# Patient Record
Sex: FEMALE | Race: WHITE | Employment: FULL TIME | URBAN - METROPOLITAN AREA
[De-identification: names, ages, dates, MRNs, and addresses within clinical notes are randomized per-mention and may not be internally consistent; named-entity substitution may affect disease eponyms.]

---

## 2018-12-28 ENCOUNTER — OFFICE VISIT (OUTPATIENT)
Dept: FAMILY MEDICINE CLINIC | Age: 48
End: 2018-12-28

## 2018-12-28 VITALS
SYSTOLIC BLOOD PRESSURE: 116 MMHG | RESPIRATION RATE: 16 BRPM | DIASTOLIC BLOOD PRESSURE: 78 MMHG | BODY MASS INDEX: 26.22 KG/M2 | TEMPERATURE: 97.7 F | WEIGHT: 148 LBS | HEIGHT: 63 IN | HEART RATE: 64 BPM

## 2018-12-28 DIAGNOSIS — E16.1 REACTIVE HYPOGLYCEMIA: ICD-10-CM

## 2018-12-28 DIAGNOSIS — Z98.890 HISTORY OF BILATERAL BREAST REDUCTION SURGERY: ICD-10-CM

## 2018-12-28 DIAGNOSIS — Z00.00 WELL ADULT HEALTH CHECK: Primary | ICD-10-CM

## 2018-12-28 DIAGNOSIS — Z12.39 SCREENING FOR BREAST CANCER: ICD-10-CM

## 2018-12-28 DIAGNOSIS — S43.421A SPRAIN OF RIGHT ROTATOR CUFF CAPSULE, INITIAL ENCOUNTER: ICD-10-CM

## 2018-12-28 DIAGNOSIS — Z13.1 SCREENING FOR DIABETES MELLITUS: ICD-10-CM

## 2018-12-28 DIAGNOSIS — Z13.220 SCREENING, LIPID: ICD-10-CM

## 2018-12-28 DIAGNOSIS — Z11.4 ENCOUNTER FOR SCREENING FOR HIV: ICD-10-CM

## 2018-12-28 PROCEDURE — 99386 PREV VISIT NEW AGE 40-64: CPT | Performed by: FAMILY MEDICINE

## 2018-12-28 RX ORDER — INFLUENZA A VIRUS A/SINGAPORE/GP1908/2015 IVR-180 (H1N1) ANTIGEN (MDCK CELL DERIVED, PROPIOLACTONE INACTIVATED), INFLUENZA A VIRUS A/NORTH CAROLINA/04/2016 (H3N2) HEMAGGLUTININ ANTIGEN (MDCK CELL DERIVED, PROPIOLACTONE INACTIVATED), INFLUENZA B VIRUS B/IOWA/06/2017 HEMAGGLUTININ ANTIGEN (MDCK CELL DERIVED, PROPIOLACTONE INACTIVATED), INFLUENZA B VIRUS B/SINGAPORE/INFTT-16-0610/2016 HEMAGGLUTININ ANTIGEN (MDCK CELL DERIVED, PROPIOLACTONE INACTIVATED) 15; 15; 15; 15 UG/.5ML; UG/.5ML; UG/.5ML; UG/.5ML
INJECTION, SUSPENSION INTRAMUSCULAR
Refills: 0 | COMMUNITY
Start: 2018-10-10 | End: 2018-12-28

## 2018-12-28 RX ORDER — ALBUTEROL SULFATE 90 UG/1
AEROSOL, METERED RESPIRATORY (INHALATION)
COMMUNITY
End: 2018-12-28

## 2018-12-28 RX ORDER — CLINDAMYCIN HYDROCHLORIDE 150 MG/1
CAPSULE ORAL
COMMUNITY
End: 2018-12-28

## 2018-12-28 RX ORDER — OXYCODONE HYDROCHLORIDE AND ACETAMINOPHEN 5; 325 MG/1; MG/1
TABLET ORAL
COMMUNITY
End: 2018-12-28

## 2018-12-28 RX ORDER — OSELTAMIVIR PHOSPHATE 75 MG/1
CAPSULE ORAL
COMMUNITY
End: 2018-12-28

## 2018-12-28 RX ORDER — NAPROXEN 500 MG/1
TABLET ORAL
COMMUNITY
End: 2018-12-28

## 2018-12-28 RX ORDER — METHOCARBAMOL 750 MG/1
TABLET, FILM COATED ORAL
COMMUNITY
End: 2018-12-28

## 2018-12-28 RX ORDER — DULOXETIN HYDROCHLORIDE 60 MG/1
CAPSULE, DELAYED RELEASE ORAL
COMMUNITY
End: 2018-12-28

## 2018-12-28 RX ORDER — PHENDIMETRAZINE TARTRATE 105 MG/1
CAPSULE, EXTENDED RELEASE ORAL
COMMUNITY
End: 2018-12-28

## 2018-12-28 RX ORDER — HYDROCHLOROTHIAZIDE 25 MG/1
TABLET ORAL
COMMUNITY
End: 2018-12-28

## 2018-12-28 RX ORDER — PROMETHAZINE HYDROCHLORIDE 25 MG/1
TABLET ORAL
COMMUNITY
End: 2018-12-28

## 2018-12-28 RX ORDER — PHENTERMINE HYDROCHLORIDE 30 MG/1
30 CAPSULE ORAL EVERY MORNING
COMMUNITY
End: 2021-08-30

## 2018-12-28 RX ORDER — METHYLPREDNISOLONE 4 MG/1
TABLET ORAL
COMMUNITY
End: 2018-12-28

## 2018-12-28 ASSESSMENT — PATIENT HEALTH QUESTIONNAIRE - PHQ9
SUM OF ALL RESPONSES TO PHQ QUESTIONS 1-9: 0
SUM OF ALL RESPONSES TO PHQ9 QUESTIONS 1 & 2: 0
2. FEELING DOWN, DEPRESSED OR HOPELESS: 0
SUM OF ALL RESPONSES TO PHQ QUESTIONS 1-9: 0
1. LITTLE INTEREST OR PLEASURE IN DOING THINGS: 0

## 2018-12-28 NOTE — PATIENT INSTRUCTIONS
INSTRUCTIONS  NEXT APPOINTMENT: Please schedule fasting annual physical (30 minutes) in one year. OK to have water, black coffee and medications (except for diabetes medicines). · PLEASE TAKE THIS FORM TO CHECK-OUT WINDOW TO SCHEDULE NEXT VISIT. PLEASE GET FASTING BLOODWORK DRAWN SOON. Lab is on first floor in [address]. Hours Monday to Friday 7 AM to 5 PM.  Take orders with you. · RESULTS- most blood tests back in couple days. We will call you if any problems. If bloodwork good, you will get letter in mail or notified thru [address] (if signed up) within 2 weeks. If you do not, please call office. · For pain, may take OTC tylenol arthritis (acetaminophen 8 hour) 2 tabs three times per day  · Use heat 20 minutes on painful joint/muscle. Then do stretches. May ice any sore spots or for swelling afterwards. Patient Education     ROTATOR CUFF INJURY     What is a rotator cuff injury? A rotator cuff injury is a strain or tear in the group of tendons and muscles that hold your shoulder joint together and help move your shoulder. How does it occur? A rotator cuff injury may result from:   using your arm to break a fall   falling onto your arm   lifting a heavy object   use of your shoulder in sports with a repetitive overhead movement, such as swimming, baseball (mainly pitchers), football, and tennis, which gradually strains the tendon   manual labor such as painting, plastering, raking leaves, or housework. What are the symptoms? The symptoms of a torn rotator cuff are:   arm and shoulder pain   shoulder weakness   shoulder tenderness   loss of shoulder movement, especially overhead. How is it diagnosed? Your doctor will perform a physical exam and check your shoulder for pain, tenderness, and loss of motion as you move your arm in all directions. Your doctor also will ask whether your shoulder pain began suddenly or gradually.  An x-ray may be done to rule out fractures and bone hands, palms up. Your upper arms should be resting on the floor, your elbows at your sides and bent 90°. Using your good arm, push your injured arm out away from your body while keeping the elbow of the injured arm at your side. Hold the stretch for 5 seconds. Repeat 10 times. Isometrics   1. External rotation: Standing in a doorway with your elbow bent 90° and the back of your hand pressing against the door frame, attempt to press your hand outward into the door frame. Hold for 5 seconds. Do 3 sets of 10.   2. Internal rotation: Standing in a doorway with your elbow bent 90° and the front of your hand pressing against the door frame, attempt to press your palm into the door frame. Hold for 5 seconds. Do 3 sets of 10. Tubing exercise for external rotation: Stand resting the hand of your injured side against your stomach. With that hand grasp tubing that is connected to a doorknob or other object at waist level. Keeping your elbow in at your side, rotate your arm outward and away from your waist. Make sure you keep your elbow bent 90 degrees and your forearm parallel to the floor. Repeat 10 times. Build up to 3 sets of 10. Supraspinatus exercise: Standing with your arms at your sides and your thumbs pointed toward the floor. Lift your arms up and out from your sides, keeping your elbows straight. Lift your hands only to shoulder level. Hold 5 seconds. Do 3 sets of 10. Gradually add weight to your hands to increase your strength. Neck Spasm Rehabilitation Exercises     You may do these exercises right away. Neck flexion   Forward: Clasp your hands behind your head and let the weight of your arms pull your chin to your chest. Relax. Hold for a count of 15. Do this 3 times. Right: Turn your head to the right and clasp your hands behind your head. Let the weight of your arms pull your chin to the right side of your chest. Relax. Hold for a count of 15. Do this 3 times.    Left: Turn your head to the left and clasp your hands behind your head. Let the weight of your arms pull your chin to the left side of your chest. Relax. Hold for a count of 15. Do this 3 times. Upper trapezius stretch: The upper trapezius muscle connects your shoulder to your head. Sitting in an upright position, put your right arm behind your back and gently grasp the right side of your head with your left hand to help tilt your head toward the left. You will feel a gentle stretch on your right side. Hold for 15 to 30 seconds. Repeat 3 times on each side. Neck rotation   Right side: Rotate your neck by looking over your right shoulder. Lift your right hand and place your palm on the left side of your chin. Push your chin with your palm toward your right shoulder. Hold for a count of 10. Do this 3 times. Left side: Rotate your neck by looking over your left shoulder. Lift your left hand and place your palm on the right side of your chin. Push your chin with your palm toward your left shoulder. Hold for a count of 10. Do this 3 times. Scapular squeezes: While sitting or standing with your arms by your sides, squeeze your shoulder blades together and hold for 5 seconds. Do 3 sets of 10. Thoracic extension: While sitting in a chair, clasp both arms behind your head. Gently arch backward and look up toward the ceiling. Repeat 10 times. Do this several times per day. OSTEOARTHRITIS    Overview   What is arthritis? Arthritis is inflammation (swelling) of the joints. It causes pain and usually also limits movement of the joints that are affected. There are many kinds of arthritis. What is osteoarthritis? Osteoarthritis (say: bdz-pwd-yd-arth-rye-tis) is the most common kind of arthritis. Osteoarthritis (also called degenerative joint disease) can affect any joint in your body and causes the cushion layer between your bones (called the cartilage) to wear away.      Causes & Risk Factors   What causes really help? Yes. Special devices (see box below) and different ways of doing things can help people who have arthritis stay independent for as long as possible. These devices help protect your joints and keep you moving. For example, if you learn to use a cane the right way, you can help reduce the amount of pressure your weight puts on your hip joint when you walk by up to 60%. Talk to your doctor if you think a special device may help your arthritis. Special devices for people who have arthritis  Canes, walkers and splints   Shoe inserts, wedges or pads   Special fasteners (such as Velcro) on clothing   Large  for tools and utensils (wrap foam or fabric around items with narrow handles, like pens)   Wall-mounted jar openers   Electric appliances, such as can openers and knives   Mobile shower heads   Bath seats and grab bars for the bathtub     Will special exercises really help? Yes. Exercise keeps your muscles strong and helps you stay flexible. Exercises that don't strain your joints are best. To avoid pain and injury, choose exercises that can be done in small amounts with rest time in between. Dancing, weight lifting and bike riding are good exercises for people who have arthritis. Try tightening your muscles and then relaxing them a number of times. You can do this with all of your major muscle groups. You could also try an \"aquacise\" program available through your local swimming pool or community center. These programs involve special movements in the pool, with much of your body's weight held up by water. Talk to your doctor before starting a new exercise program.    Should I use heat to ease pain? Using heat may reduce your pain and stiffness. Heat can be applied through warm baths, hot towels, hot water bottles or heating pads. Try alternating heat with ice packs.

## 2019-01-27 PROBLEM — Z00.00 WELL ADULT HEALTH CHECK: Status: RESOLVED | Noted: 2018-12-28 | Resolved: 2019-01-27

## 2019-01-27 PROBLEM — Z12.39 SCREENING FOR BREAST CANCER: Status: RESOLVED | Noted: 2018-12-28 | Resolved: 2019-01-27

## 2019-04-29 ENCOUNTER — TELEPHONE (OUTPATIENT)
Dept: FAMILY MEDICINE CLINIC | Age: 49
End: 2019-04-29

## 2019-04-29 RX ORDER — AZITHROMYCIN 250 MG/1
TABLET, FILM COATED ORAL
Qty: 1 PACKET | Refills: 0 | Status: SHIPPED | OUTPATIENT
Start: 2019-04-29 | End: 2019-05-04

## 2019-04-29 NOTE — TELEPHONE ENCOUNTER
Pt is calling to say that she has a sinus infection and a bad cough. The pt will like this to be called in due to she is out of town for work. The pt has been sick for 4 days now, with greenish phloem coming up, and slight head ache with sinus pressure. PT CB# [phone]    Can we send the Rx to [facility] located at [facility]. [phone]  FAX 6 [facility].

## 2019-09-12 ENCOUNTER — TELEPHONE (OUTPATIENT)
Dept: FAMILY MEDICINE CLINIC | Age: 49
End: 2019-09-12

## 2020-01-26 ENCOUNTER — HOSPITAL ENCOUNTER (EMERGENCY)
Age: 50
Discharge: HOME | End: 2020-01-26
Payer: COMMERCIAL

## 2020-01-26 ENCOUNTER — APPOINTMENT (OUTPATIENT)
Dept: GENERAL RADIOLOGY | Age: 50
End: 2020-01-26
Payer: COMMERCIAL

## 2020-01-26 VITALS
WEIGHT: 154.32 LBS | HEART RATE: 73 BPM | SYSTOLIC BLOOD PRESSURE: 114 MMHG | TEMPERATURE: 97.6 F | OXYGEN SATURATION: 98 % | RESPIRATION RATE: 15 BRPM | BODY MASS INDEX: 27.34 KG/M2 | DIASTOLIC BLOOD PRESSURE: 73 MMHG

## 2020-01-26 PROCEDURE — 73560 X-RAY EXAM OF KNEE 1 OR 2: CPT

## 2020-01-26 PROCEDURE — 99283 EMERGENCY DEPT VISIT LOW MDM: CPT

## 2020-01-26 PROCEDURE — 73562 X-RAY EXAM OF KNEE 3: CPT

## 2020-01-26 RX ORDER — IBUPROFEN 600 MG/1
600 TABLET ORAL
Qty: 40 TABLET | Refills: 0 | Status: SHIPPED | OUTPATIENT
Start: 2020-01-26 | End: 2021-08-30

## 2020-01-26 RX ORDER — ACETAMINOPHEN 500 MG
1000 TABLET ORAL
Qty: 30 TABLET | Refills: 0 | Status: SHIPPED | OUTPATIENT
Start: 2020-01-26 | End: 2021-08-30 | Stop reason: ALTCHOICE

## 2020-01-26 ASSESSMENT — PAIN DESCRIPTION - LOCATION: LOCATION: KNEE

## 2020-01-26 ASSESSMENT — PAIN DESCRIPTION - ONSET: ONSET: ON-GOING

## 2020-01-26 ASSESSMENT — PAIN SCALES - GENERAL
PAINLEVEL_OUTOF10: 9
PAINLEVEL_OUTOF10: 7

## 2020-01-26 ASSESSMENT — PAIN DESCRIPTION - FREQUENCY: FREQUENCY: CONTINUOUS

## 2020-01-26 ASSESSMENT — PAIN DESCRIPTION - PROGRESSION: CLINICAL_PROGRESSION: NOT CHANGED

## 2020-01-26 ASSESSMENT — PAIN DESCRIPTION - ORIENTATION: ORIENTATION: RIGHT

## 2020-01-26 ASSESSMENT — PAIN - FUNCTIONAL ASSESSMENT: PAIN_FUNCTIONAL_ASSESSMENT: PREVENTS OR INTERFERES SOME ACTIVE ACTIVITIES AND ADLS

## 2020-01-26 ASSESSMENT — PAIN DESCRIPTION - PAIN TYPE: TYPE: ACUTE PAIN

## 2020-01-26 NOTE — ED PROVIDER NOTES
**EVALUATED BY ADVANCED PRACTICE PROVIDER**        [address]      Pt Name: Artemio Amanda  YIS:[id number]  [place] 1970  Date of evaluation: 2020  Provider: Oriana Estevez PA-C      Chief Complaint:    Chief Complaint   Patient presents with    Knee Pain     Pt reports to the ED with R knee pain with \"a tight and burning feeling\". Pt reports a consitant pain. Hx of lower R foot pain and progressively going up the R leg. Nursing Notes, Past Medical Hx, Past Surgical Hx, Social Hx, Allergies, and Family Hx were all reviewed and agreed with or any disagreements were addressed in the HPI.    HPI:  (Location, Duration, Timing, Severity, Quality, Assoc Sx, Context, Modifying factors)  This is a  47 y.o. female presenting with significant other. Patient with complaint right knee pain x2-3 weeks. Patient states the bathtub about 2 weeks ago twisted the knee but did not impact the knee on fall. Patient states progressive over the past 1 week. Worse going up stairs. She indicates knee feels tight at times. She also notes clicking or even a locking sensation where she cannot fully flex the knee. No prior history of knee trouble. She also indicates some lateral mid and distal thigh pain. She has some posterior lateral knee pain. No hip pain or ankle pain. PastMedical/Surgical History:      Diagnosis Date    Hypoglycemia     Reactive hypoglycemia 2018         Procedure Laterality Date    BREAST REDUCTION SURGERY  10/2004     SECTION  , 1991    x 2    CHOLECYSTECTOMY  2015    HYSTERECTOMY  2006    partial    ULNAR TUNNEL RELEASE         Medications:  Previous Medications    PHENTERMINE 30 MG CAPSULE    Take 30 mg by mouth every morning. .         Review of Systems:  Review of Systems  Positives and Pertinent negatives as per HPI.   Except as noted above in the ROS, problem specific ROS was PA-C have directly visualized the radiologic plain film image(s) with the below findings:    X-ray shows no evidence of acute osseous abnormality. Interpretation per the Radiologist below, if available at the time of this note:    XR KNEE RIGHT (1-2 VIEWS)   Preliminary Result   No acute abnormality of the right knee. No significant arthritic change is   evident. Xr Knee Right (1-2 Views)    Result Date: 1/26/2020  No acute abnormality of the right knee. No significant arthritic change is evident. MEDICAL DECISION MAKING / ED COURSE:      PROCEDURES:   Procedures    Ace wrap applied by staff. Inspection by myself reveals appropriate placement without neurovascular compromise. Patient was given:  Medications - No data to display    I do believe the patient has chondromalacia of the femoral condyle of the right knee. This is an area most tenderness. Ace wrap applied. Start NSAID. Refer to orthopedics. Ice/heat may benefit. The patient does express understanding of her diagnosis and the treatment plan. The patient tolerated their visit well. I evaluated the patient. The physician was available for consultation as needed. The patient and / or the family were informed of the results of any tests, a time was given to answer questions, a plan was proposed and they agreed with plan. CLINICAL IMPRESSION:  1. Acute pain of right knee    2.  Chondromalacia of knee, right        DISPOSITION Decision To Discharge 01/26/2020 12:10:25 PM      PATIENT REFERRED TO:  Brissa Donald MD  [address]  [address] [place] [address]  [phone]    Schedule an appointment as soon as possible for a visit in 3 days      Char Mcduffie MD  [address] [facility] [address] [facility] [id number] [phone]    Schedule an appointment as soon as possible for a visit   As needed    [facility] Emergency Department  [address]

## 2020-02-05 ENCOUNTER — TELEPHONE (OUTPATIENT)
Dept: FAMILY MEDICINE CLINIC | Age: 50
End: 2020-02-05

## 2020-12-28 ENCOUNTER — TELEPHONE (OUTPATIENT)
Dept: FAMILY MEDICINE CLINIC | Age: 50
End: 2020-12-28

## 2021-08-30 ENCOUNTER — OFFICE VISIT (OUTPATIENT)
Dept: FAMILY MEDICINE CLINIC | Age: 51
End: 2021-08-30
Payer: COMMERCIAL

## 2021-08-30 VITALS
DIASTOLIC BLOOD PRESSURE: 70 MMHG | SYSTOLIC BLOOD PRESSURE: 118 MMHG | BODY MASS INDEX: 27.89 KG/M2 | HEART RATE: 77 BPM | WEIGHT: 157.38 LBS | HEIGHT: 63 IN | OXYGEN SATURATION: 98 %

## 2021-08-30 DIAGNOSIS — S16.1XXA STRAIN OF NECK MUSCLE, INITIAL ENCOUNTER: Primary | ICD-10-CM

## 2021-08-30 DIAGNOSIS — S46.911A STRAIN OF RIGHT SHOULDER, INITIAL ENCOUNTER: ICD-10-CM

## 2021-08-30 DIAGNOSIS — S29.019A THORACIC MYOFASCIAL STRAIN, INITIAL ENCOUNTER: ICD-10-CM

## 2021-08-30 PROCEDURE — 99214 OFFICE O/P EST MOD 30 MIN: CPT | Performed by: NURSE PRACTITIONER

## 2021-08-30 RX ORDER — ACETAMINOPHEN 160 MG
TABLET,DISINTEGRATING ORAL DAILY
COMMUNITY

## 2021-08-30 RX ORDER — LEVOTHYROXINE AND LIOTHYRONINE 19; 4.5 UG/1; UG/1
30 TABLET ORAL DAILY
COMMUNITY

## 2021-08-30 RX ORDER — PROGESTERONE 200 MG/1
200 CAPSULE ORAL DAILY
COMMUNITY

## 2021-08-30 RX ORDER — PREDNISONE 20 MG/1
20 TABLET ORAL 2 TIMES DAILY
Qty: 10 TABLET | Refills: 0 | Status: SHIPPED | OUTPATIENT
Start: 2021-08-30 | End: 2021-09-04

## 2021-08-30 RX ORDER — CYCLOBENZAPRINE HCL 10 MG
10 TABLET ORAL 3 TIMES DAILY PRN
Qty: 21 TABLET | Refills: 0 | Status: SHIPPED | OUTPATIENT
Start: 2021-08-30 | End: 2021-09-09

## 2021-08-30 ASSESSMENT — PATIENT HEALTH QUESTIONNAIRE - PHQ9
SUM OF ALL RESPONSES TO PHQ QUESTIONS 1-9: 0
1. LITTLE INTEREST OR PLEASURE IN DOING THINGS: 0
SUM OF ALL RESPONSES TO PHQ QUESTIONS 1-9: 0
SUM OF ALL RESPONSES TO PHQ QUESTIONS 1-9: 0
SUM OF ALL RESPONSES TO PHQ9 QUESTIONS 1 & 2: 0
2. FEELING DOWN, DEPRESSED OR HOPELESS: 0
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
## 2021-09-09 ENCOUNTER — HOSPITAL ENCOUNTER (OUTPATIENT)
Dept: PHYSICAL THERAPY | Age: 51
Setting detail: THERAPIES SERIES
Discharge: HOME | End: 2021-09-09
Payer: COMMERCIAL

## 2021-09-09 PROCEDURE — 97110 THERAPEUTIC EXERCISES: CPT

## 2021-09-09 PROCEDURE — 97140 MANUAL THERAPY 1/> REGIONS: CPT

## 2021-09-09 PROCEDURE — 97161 PT EVAL LOW COMPLEX 20 MIN: CPT

## 2021-09-09 NOTE — FLOWSHEET NOTE
[address] [facility] [address]  Phone: [phone]   Fax:     [phone]    Physical Therapy Treatment Note/ Progress Report:     Date:  2021    Patient Name:  Myla Kuhn    :  1970  MRN: [id number]    Pertinent Medical History: Additional Pertinent Hx: hypoglycemia    Medical/Treatment Diagnosis Information:  · Diagnosis: S16. 1XXA (ICD-10-CM) - Strain of neck muscle, initial [id number] (ICD-10-CM) - Thoracic myofascial strain, initial [id number] (ICD-10-CM) - Strain of right shoulder, initial encounter  · Treatment Diagnosis: decreased function    Insurance/Certification information:  PT Insurance Information: generic auto  Physician Information:  Referring Practitioner: Keyonna Arias  Plan of care signed (Y/N): routed  Date of Patient follow up with Physician:      Progress Report: []  Yes  [x]  No     Date Range for reporting period:  Beginnin2021  Ending:     Progress report due (10 Rx/or 30 days whichever is less):      Recertification due (POC duration/ or 90 days whichever is less):      Visit # Insurance/POC Allowable Auth Needed   1 12 []Yes    []No     Functional Outcomes Measure:    Date Assessed: at eval  ndi- 18    Pain level:  5/10     SUBJECTIVE:  Pt states, \" I am still really sore with certain things \"    OBJECTIVE:   :   CERV ROM       Cervical Flexion 25 Pain with all motions on the right   Cervical Extension 10       Left Right   Cervical SB 10 10   Cervical rotation 30 35   Quadrant   +   Dorsal Glide        UE ROM Left Right   Shoulder Flex wfl 90 pain in thoracic, cerv, traps   Shoulder Abd   70   Shoulder ER   50   Shoulder IR   50   Elbow flex/ext   wfl   Wrist flex/ext/pro/sup       Finger flex/ext/opposition       Shoulder AROM WNL w OP       UE Strength  Left Right   Shoulder Flex   3+   Shoulder Scap   3+   Shoulder ABd (C5 Axillary)   3+   Shoulder ER    3+   Shoulder IR   3+           RESTRICTIONS/PRECAUTIONS:     Exercises/Interventions:   Therapeutic Ex (03369)  Min: Resistance/Repetitions Notes   cerv isos     Traps stretch     rotation     Add/ext                         Manual Intervention (70117)  Min:     Cerv mobs/manip     Thoracic mobs/manip     CT manip     Rib mobilizations     STM          NMR re-education (28225)  Min:               Therapeutic Activity (49770)  Min:               Modalities  Min:                  Other Therapeutic Activities:  Pt was educated on PT POC, Diagnosis, Prognosis, pathomechanics as well as frequency and duration of scheduling future physical therapy appointments. Time was also taken on this day to answer all patient questions and participation in PT. Reviewed appointment policy in detail with patient and patient verbalized understanding. Home Exercise Program:Patient demonstrated proper technique, good tolerance,  and was given written instructions for the above exercises  Access Code: [email or web address]  URL: [email or web address]  Date: 09/09/2021  Prepared by: Jean Pierre Horner    Exercises  Supine Cervical Rotation AROM on Pillow - 1 x daily - 7 x weekly - 3 sets - 10 reps  Hooklying Isometric Upper Neck Rotation - 1 x daily - 7 x weekly - 3 sets - 10 reps  Seated Shoulder Shrug Circles AROM Forward - 1 x daily - 7 x weekly - 3 sets - 10 reps  Modified Sidelying Thoracic Rotation - 1 x daily - 7 x weekly - 3 sets - 10 reps  Seated Scapular Retraction - 1 x daily - 7 x weekly - 3 sets - 10 reps      Therapeutic Exercise and NMR EXR  [] (13125) Provided verbal/tactile cueing for activities related to strengthening, flexibility, endurance, ROM  for improvements in cervical, postural, scapular, scapulothoracic and UE control with self care, reaching, carrying, lifting, house/yardwork, driving/computer work.    [] (61701) Provided verbal/tactile cueing for activities related to improving balance, coordination, kinesthetic sense, posture, motor skill, proprioception  to assist with cervical, scapular, scapulothoracic and UE control with self care, reaching, carrying, lifting, house/yardwork, driving/computer work. Therapeutic Activities:    [] (15505 or 39419) Provided verbal/tactile cueing for activities related to improving balance, coordination, kinesthetic sense, posture, motor skill, proprioception and motor activation to allow for proper function of cervical, scapular, scapulothoracic and UE control with self care, carrying, lifting, driving/computer work.      Home Exercise Program:    [] (81631) Reviewed/Progressed HEP activities related to strengthening, flexibility, endurance, ROM of cervical, scapular, scapulothoracic and UE control with self care, reaching, carrying, lifting, house/yardwork, driving/computer work  [] (14131) Reviewed/Progressed HEP activities related to improving balance, coordination, kinesthetic sense, posture, motor skill, proprioception of cervical, scapular, scapulothoracic and UE control with self care, reaching, carrying, lifting, house/yardwork, driving/computer work      Manual Treatments:  PROM / STM / Oscillations-Mobs:  G-I, II, III, IV (PA's, Inf., Post.)  [] (58343) Provided manual therapy to mobilize soft tissue/joints of cervical/CT, scapular GHJ and UE for the purpose of decreasing headache, modulating pain, promoting relaxation,  increasing ROM, reducing/eliminating soft tissue swelling/inflammation/restriction, improving soft tissue extensibility and allowing for proper ROM for normal function with self care, reaching, carrying, lifting, house/yardwork, driving/computer work    If [facility] Please Indicate Time In/Out  CPT Code Time in Time out                                   Approval Dates:  CPT Code Units Approved Units Used  Date Updated:                     Charges:  Timed Code Treatment Minutes: 30   Total Treatment Minutes: 50     [x] EVAL (LOW) 65694 (typically 20 minutes face-to-face)  [] EVAL (MOD) 76178 (typically 30 minutes face-to-face)  [] EVAL (HIGH) 14438 (typically 45 minutes face-to-face)  [] RE-EVAL     [x] OJ(04129) x 1    [] Dry needle 1 or 2 Muscles (75865)  [] NMR (82093) x     [] Dry needle 3+ Muscles (58075)  [x] Manual (75843) x 1    [] Ultrasound (27528) x  [] TA (79264) x     [] Mech Traction (68025)  [] ES(attended) (26832)     [] ES (un) (68981):   [] Vasopump (21338) [] Ionto (39746)   [] Other:    GOALSGOALS:  Patient stated goal: \" I want to have less pain \"  []? Progressing: []? Met: []? Not Met: []? Adjusted     Therapist goals for Patient:   Short Term Goals: To be achieved in: 2 weeks  1. Independent in HEP and progression per patient tolerance, in order to prevent re-injury. []? Progressing: []? Met: []? Not Met: []? Adjusted  2. Patient will have a decrease in pain to facilitate improvement in movement, function, and ADLs as indicated by Functional Deficits. []? Progressing: []? Met: []? Not Met: []? Adjusted     Long Term Goals: To be achieved in: 8 weeks  1. Disability index score of 25%% or less for the NDI to assist with reaching prior level of function. []? Progressing: []? Met: []? Not Met: []? Adjusted  2. Patient will demonstrate increased AROM to [facility] of cervical/thoracic spine to allow for proper joint functioning as indicated by patients Functional Deficits. []? Progressing: []? Met: []? Not Met: []? Adjusted  3. Patient will demonstrate an increase in postural awareness and control and activation of  Deep cervical stabilizers to allow for proper functional mobility as indicated by patients Functional Deficits. []? Progressing: []? Met: []? Not Met: []? Adjusted  4. Patient will return to 75 functional activities without increased symptoms or restriction. []? Progressing: []? Met: []? Not Met: []? Adjusted  5. (patient specific functional goal)    []? Progressing: []? Met: []? Not Met: []?  Adjusted ASSESSMENT:  See eval    Treatment/Activity Tolerance:  [x] Patient tolerated treatment well [] Patient limited by fatique  [] Patient limited by pain  [] Patient limited by other medical complications  [] Other:     Overall Progression Towards Functional goals/ Treatment Progress Update:  [] Patient is progressing as expected towards functional goals listed. [] Progression is slowed due to complexities/Impairments listed. [] Progression has been slowed due to co-morbidities. [x] Plan just implemented, too soon to assess goals progression <30days   [] Goals require adjustment due to lack of progress  [] Patient is not progressing as expected and requires additional follow up with physician  [] Other    Prognosis for POC: [x] Good [] Fair  [] Poor    Patient requires continued skilled intervention: [x] Yes  [] No        PLAN: Cervical, Thoracic, scapular, shoulder rom, strength, mfr, joint mobs, postural exercises,  stretches, modalities, patient education , home exercise plan, cervical traction, work, resting, and sleeping positions, may do dn      [] Continue per plan of care [] Alter current plan (see comments)  [x] Plan of care initiated [] Hold pending MD visit [] Discharge    Electronically signed by: Damaris Herrera PT    Note: If patient does not return for scheduled/recommended follow up visits, this note will serve as a discharge from care along with the most recent update on progress.

## 2021-09-09 NOTE — PLAN OF CARE
[facility]  [address] [facility]. [place] [address]  Phone: [phone]   Fax:     [phone]          Physical Therapy Certification    Dear Referring Practitioner: Power Balderas think this patient may benefit from dry needling. Please sign the following if you are in agreement with this. If you have any reservations or questions please contact me at [phone]  Thanks, Sincerely, [facility]ros PT      We had the pleasure of evaluating the following patient for physical therapy services at [facility]. A summary of our findings can be found in the initial assessment below. This includes our plan of care. If you have any questions or concerns regarding these findings, please do not hesitate to contact me at the office phone number checked above. Thank you for the referral.       Physician Signature:_______________________________Date:__________________  By signing above (or electronic signature), therapists plan is approved by physician            Patient: Katlin Sanford   : 1970   MRN: [id number]  Referring Physician: Referring Practitioner: Elsa Sullivan      Evaluation Date: 2021      Medical Diagnosis Information:  Diagnosis: S16. 1XXA (ICD-10-CM) - Strain of neck muscle, initial [id number] (ICD-10-CM) - Thoracic myofascial strain, initial [id number] (ICD-10-CM) - Strain of right shoulder, initial encounter   Treatment Diagnosis: decreased function                                         Insurance information: PT Insurance Information: generic auto    Precautions/ Contra-indications:   Latex Allergy:  [x]NO      []YES  Preferred Language for Healthcare:   [x]English       []other:    C-SSRS Triggered by Intake questionnaire (Past 2 wk assessment ):   [x] No, Questionnaire did not trigger screening.   [] Yes, Patient intake triggered C-SSRS Shoulder ABd (C5 Axillary)  3+   Shoulder ER   3+   Shoulder IR  3+   Elbow Flex (C5 Musc)  5   Elbow Ext (C7 Radial)     Wrist Flex (C6 Radial)     Wrist Ext (C7 Radial)     Finger flex (C8 median)     Finger ext (C7 Radial-PIN)     APB (T1 Median)     Finger Abd (T1 Ulnar)     UE myotomes WNL        Reflexes Normal Abnormal Comments               S1-2 Seated achilles [] []    S1-2 Prone knee bend [] []    L3-4 Patellar tendon [] []    C5-6 Biceps [x] []    C6 Brachioradialis [x] []    C7-8 Triceps [] [x] 1     Reflexes/Sensation:    [x]Dermatomes/Myotomes intact    [x]Reflexes equal and normal bilaterally   []Other:    Joint mobility:    []Normal    [x]Hypo   []Hyper        Orthopedic Special Tests:Laird, Belly press, Hornblower, Cross body all painful, hard to distinguish at this time. Cluster Testing  Normal Abnormal N/A Comments   Babinski Test [] [] []    Kerr Test [] [] []    Inverted Sup Sign [] [] []    Alar Ligament Test [] [] []    Transverse Ligament Test [] [] []    Sharp-Brayan Test [] [] []    Hautards Test [] [] []    Vertebral Artery Test [] [] []             Neural dynamic/ Tension testing Normal Abnormal N/A Comments   Spurling Maneuver:  [] [x] []    Distraction testing: [] [] []    ULNT [] [] []    Shoulder Abd testing  [] [] []    Cerv Rot/Lat Flex- 1st Rib [] [] []    Deep Neck Flex/endurance testing [] [] []    Craniocerv Flex testing Valerie Red [] [] []    End Range Tolerance testing. [] [] []     [] [] []                           [x] Patient history, allergies, meds reviewed. Medical chart reviewed. See intake form. Review Of Systems (ROS):  [x]Performed Review of systems (Integumentary, CardioPulmonary, Neurological) by intake and observation. Intake form has been scanned into medical record. Patient has been instructed to contact their primary care physician regarding ROS issues if not already being addressed at this time.       Co-morbidities/Complexities (which will affect course of rehabilitation):   []None           Arthritic conditions   []Rheumatoid arthritis (M05.9)  []Osteoarthritis (M19.91)   Cardiovascular conditions   []Hypertension (I10)  []Hyperlipidemia (E78.5)  []Angina pectoris (I20)  []Atherosclerosis (I70)  []CVA Musculoskeletal conditions   []Disc pathology   []Congenital spine pathologies   []Prior surgical intervention  []Osteoporosis (M81.8)  []Osteopenia (M85.8)   Endocrine conditions   []Hypothyroid (E03.9)  []Hyperthyroid Gastrointestinal conditions   []Constipation (Y55.39)   Metabolic conditions   []Morbid obesity (E66.01)  []Diabetes type 1(E10.65) or 2 (E11.65)   []Neuropathy (G60.9)     Pulmonary conditions   []Asthma (J45)  []Coughing   []COPD (J44.9)   Psychological Disorders  []Anxiety (F41.9)  []Depression (F32.9)   []Other:   [x]Other:   hypoglycemia     Barriers to/and or personal factors that will affect rehab potential:              []Age  []Sex   []Smoker              []Motivation/Lack of Motivation                        []Co-Morbidities              []Cognitive Function, education/learning barriers              []Environmental, home barriers              []profession/work barriers  []past PT/medical experience  []other:  Justification:     Falls Risk Assessment (30 days):   [x] Falls Risk assessed and no intervention required.   [] Falls Risk assessed and Patient requires intervention due to being higher risk   TUG score (>12s at risk):     [] Falls education provided, including     ASSESSMENT:    Functional Impairments:     [x]Noted cervical/thoracic/GHJ joint hypomobility   []Noted cervical/thoracic/GHJ joint hypermobility   [x]Decreased cervical/UE functional ROM   []Noted Headache pain aggravated by neck movements with/without dizziness   []Abnormal reflexes/sensation/myotomal/dermatomal deficits   []Decreased DCF control or ability to hold head up   [x]Decreased RC/scapular/core strength and neuromuscular control    [x]Decreased UE functional strength   []other:      Functional Activity Limitations (from functional questionnaire and intake)   [x]Reduced ability to tolerate prolonged functional positions   [x]Reduced ability or difficulty with changes of positions or transfers between positions   [x]Reduced ability to maintain good posture and demonstrate good body mechanics with sitting, bending, and lifting   [x] Reduced ability or tolerance with driving and/or computer work   [x]Reduced ability to perform lifting, reaching, carrying tasks   [x]Reduced ability to concentrate   [x]Reduced ability to sleep    [x]Reduced ability to tolerate any impact through UE or spine   [x]Reduced ability to ambulate prolonged functional periods/distances   []other:    Participation Restrictions   [x]Reduced participation in self care activities   [x]Reduced participation in home management activities   [x]Reduced participation in work activities   [x]Reduced participation in social activities. [x]Reduced participation in sport/recreational activities.     Classification/Subgrouping:   [x]signs/symptoms consistent with neck pain with mobility deficits     [x]signs/symptoms consistent with neck pain with movement coordinated impairments    []signs/symptoms consistent with neck pain with radiating pain    [x]signs/symptoms consistent with neck pain with headaches (cervicogenic)    []Signs/symptoms consistent with nerve root involvement including myotome & dermatome dysfunction   [x]sign/symptoms consistent with facet dysfunction of cervical and thoracic spine    []signs/symptoms consistent suggesting central cord compression/UMN syndromes   []signs/symptoms consistent with discogenic cervical pain   []signs/symptoms consistent with rib dysfunction   []signs/symptoms consistent with postural dysfunction   [x]signs/symptoms consistent with shoulder pathology    []signs/symptoms consistent with post-surgical status including decreased ROM, strength and function. [x]signs/symptoms consistent with pathology which may benefit from Dry Needling   []signs/symptoms which may limit the use of advanced manual therapy techniques: (Elevated CV risk profile, recent trauma, intolerance to end range positions, prior TIA, visual issues, UE neurological compromise )     Prognosis/Rehab Potential:      []Excellent   [x]Good    []Fair   []Poor    Tolerance of evaluation/treatment:    []Excellent   [x]Good    []Fair   []Poor    Physical Therapy Evaluation Complexity Justification  [x] A history of present problem with:  [x] no personal factors and/or comorbidities that impact the plan of care;  []1-2 personal factors and/or comorbidities that impact the plan of care  []3 personal factors and/or comorbidities that impact the plan of care  [x] An examination of body systems using standardized tests and measures addressing any of the following: body structures and functions (impairments), activity limitations, and/or participation restrictions;:  [x] a total of 1-2 or more elements   [] a total of 3 or more elements   [] a total of 4 or more elements   [x] A clinical presentation with:  [x] stable and/or uncomplicated characteristics   [] evolving clinical presentation with changing characteristics  [] unstable and unpredictable characteristics;   [x] Clinical decision making of [] low, [] moderate, [] high complexity using standardized patient assessment instrument and/or measurable assessment of functional outcome. [x] EVAL (LOW) 96716 (typically 20 minutes face-to-face)  [] EVAL (MOD) 02553 (typically 30 minutes face-to-face)  [] EVAL (HIGH) 72857 (typically 45 minutes face-to-face)  [] RE-EVAL     PLAN:   Frequency/Duration:  2 days per week for 8 Weeks:  Interventions:  [x]  Therapeutic exercise including: strength training, ROM, for cervical spine,scapula, core and Upper extremity, including postural re-education.    [x]  NMR activation and proprioception for Deep cervical Ann Villafana, PT      Note: If patient does not return for scheduled/recommended follow up visits, this note will serve as a discharge from care along with the most recent update on progress.

## 2021-09-15 ENCOUNTER — HOSPITAL ENCOUNTER (OUTPATIENT)
Dept: PHYSICAL THERAPY | Age: 51
Setting detail: THERAPIES SERIES
Discharge: HOME | End: 2021-09-15
Payer: COMMERCIAL

## 2021-09-15 PROCEDURE — 97110 THERAPEUTIC EXERCISES: CPT

## 2021-09-15 PROCEDURE — 97140 MANUAL THERAPY 1/> REGIONS: CPT

## 2021-09-15 PROCEDURE — 20561 NDL INSJ W/O NJX 3+ MUSC: CPT

## 2021-09-15 NOTE — FLOWSHEET NOTE
[address] [facility] [address] [place] [address] [address]  Phone: [phone]   Fax:     [phone]    Physical Therapy Treatment Note/ Progress Report:     Date:  9/15/2021    Patient Name:  Neda Caicedo    :  1970  MRN: [id number]    Pertinent Medical History: Additional Pertinent Hx: hypoglycemia    Medical/Treatment Diagnosis Information:  · Diagnosis: S16. 1XXA (ICD-10-CM) - Strain of neck muscle, initial [id number] (ICD-10-CM) - Thoracic myofascial strain, initial [id number] (ICD-10-CM) - Strain of right shoulder, initial encounter  · Treatment Diagnosis: decreased function    Insurance/Certification information:  PT Insurance Information: generic auto  Physician Information:  Referring Practitioner: Nessa Em  Plan of care signed (Y/N): routed  Date of Patient follow up with Physician:      Progress Report: []  Yes  [x]  No     Date Range for reporting period:  Beginnin/15/2021  Ending:     Progress report due (10 Rx/or 30 days whichever is less): 63/3/92     Recertification due (POC duration/ or 90 days whichever is less):      Visit # Insurance/POC Allowable Auth Needed   2 12 []Yes    []No     Functional Outcomes Measure:    Date Assessed: at eval  ndi- 18    Pain level:  3-5/10     SUBJECTIVE:  Pt states, \" I am still really sore with certain things \"  07  Pt states, \" Doing ok, still sore, not quite as bad \" Continues to pop a lot \"    OBJECTIVE:   :   CERV ROM       Cervical Flexion 25 Pain with all motions on the right   Cervical Extension 10       Left Right   Cervical SB 10 10   Cervical rotation 30 35   Quadrant   +   Dorsal Glide        UE ROM Left Right   Shoulder Flex wfl 90 pain in thoracic, cerv, traps   Shoulder Abd   70   Shoulder ER   50   Shoulder IR   50   Elbow flex/ext   wfl   Wrist flex/ext/pro/sup       Finger flex/ext/opposition       Shoulder AROM WNL w OP       UE Strength  Left Right   Shoulder Flex   3+   Shoulder Scap   3+   Shoulder ABd (C5 Axillary)   3+   Shoulder ER    3+   Shoulder IR   3+           RESTRICTIONS/PRECAUTIONS:     Exercises/Interventions:   Therapeutic Ex (80431)  Min: Resistance/Repetitions Notes   cerv isos Rot x 20    Traps stretch 30 x 3    rotation     Add/ext     bilat ext Green x 30    Ir/er Green x 30    cerv prom All ranges         Manual Intervention (22331)  Min:30 Mfr to bilat tram, traps, pa's to thoracic g r 4, mfr to right shoulder muscles, prom all directions    Cerv mobs/manip     Thoracic mobs/manip     CT manip     Rib mobilizations     STM          NMR re-education (70060)  Min:               Therapeutic Activity (71774)  Min:               Modalities  Min:                  Other Therapeutic Activities:  Pt was educated on PT POC, Diagnosis, Prognosis, pathomechanics as well as frequency and duration of scheduling future physical therapy appointments. Time was also taken on this day to answer all patient questions and participation in PT. Reviewed appointment policy in detail with patient and patient verbalized understanding. Home Exercise Program:Patient demonstrated proper technique, good tolerance,  and was given written instructions for the above exercises  Access Code: [email or web address]  URL: [email or web address]  Date: 09/09/2021  Prepared by: Murphy Sena    Exercises  Supine Cervical Rotation AROM on Pillow - 1 x daily - 7 x weekly - 3 sets - 10 reps  Hooklying Isometric Upper Neck Rotation - 1 x daily - 7 x weekly - 3 sets - 10 reps  Seated Shoulder Shrug Circles AROM Forward - 1 x daily - 7 x weekly - 3 sets - 10 reps  Modified Sidelying Thoracic Rotation - 1 x daily - 7 x weekly - 3 sets - 10 reps  Seated Scapular Retraction - 1 x daily - 7 x weekly - 3 sets - 10 reps      Therapeutic Exercise and NMR EXR  [] (82045) Provided verbal/tactile cueing for activities related to strengthening, flexibility, endurance, ROM  for improvements in cervical, postural, scapular, scapulothoracic and UE control with self care, reaching, carrying, lifting, house/yardwork, driving/computer work.    [] (19631) Provided verbal/tactile cueing for activities related to improving balance, coordination, kinesthetic sense, posture, motor skill, proprioception  to assist with cervical, scapular, scapulothoracic and UE control with self care, reaching, carrying, lifting, house/yardwork, driving/computer work. Therapeutic Activities:    [] (03586 or 21093) Provided verbal/tactile cueing for activities related to improving balance, coordination, kinesthetic sense, posture, motor skill, proprioception and motor activation to allow for proper function of cervical, scapular, scapulothoracic and UE control with self care, carrying, lifting, driving/computer work.      Home Exercise Program:    [] (20621) Reviewed/Progressed HEP activities related to strengthening, flexibility, endurance, ROM of cervical, scapular, scapulothoracic and UE control with self care, reaching, carrying, lifting, house/yardwork, driving/computer work  [] (87373) Reviewed/Progressed HEP activities related to improving balance, coordination, kinesthetic sense, posture, motor skill, proprioception of cervical, scapular, scapulothoracic and UE control with self care, reaching, carrying, lifting, house/yardwork, driving/computer work      Manual Treatments:  PROM / STM / Oscillations-Mobs:  G-I, II, III, IV (PA's, Inf., Post.)  [] (65007) Provided manual therapy to mobilize soft tissue/joints of cervical/CT, scapular GHJ and UE for the purpose of decreasing headache, modulating pain, promoting relaxation,  increasing ROM, reducing/eliminating soft tissue swelling/inflammation/restriction, improving soft tissue extensibility and allowing for proper ROM for normal function with self care, reaching, carrying, lifting, house/yardwork, driving/computer work  Spoke with [employer] Luann Estrellita Spaulding,  regarding the use of Dry Needling     Dry needling manual therapy: consisted on the placement of 15 needles in the following muscles:  right infraspiantus, teres minor, , right traps, bilateral sub occipitals, splenius captitis, splenius cervici, rectus capitismajor/minor, T1,2,3,4 multifidi, , .  A 60mm for all but 4 sub occipitals were 40 mm  mm needle was inserted, piston, rotated, and coned to produce intramuscular mobilization. These techniques were used to restore functional range of motion, reduce muscle spasm and induce healing in the corresponding musculature. (41908)  Clean Technique was utilized today while applying Dry needling treatment. The treatment sites where cleaned with 70% solution of  isopropyl alcohol . The PT washed their hands and utilized treatment gloves along with hand  prior to inserting the needles. All needles where removed and discarded in the appropriate sharps container. MD has given verbal and/or written approval for this treatment. Attended low frequency (1-20Hz) electrical stimulation was utilized in conjunction with Dry Needling:  the Estim was manipulated between all above needles for a period of 10 min. at 4 volts. In multifidi  The low frequency electrical stimulation was used to help reduce muscle spasm and help to interrupt /[place] the pain cycle.  (40951)     If [facility] Please Indicate Time In/Out  CPT Code Time in Time out                                   Approval Dates:  CPT Code Units Approved Units Used  Date Updated:                     Charges:  Timed Code Treatment Minutes: 60   Total Treatment Minutes: 70     [] EVAL (LOW) 15695 (typically 20 minutes face-to-face)  [] EVAL (MOD) 42245 (typically 30 minutes face-to-face)  [] EVAL (HIGH) 11363 (typically 45 minutes face-to-face)  [] RE-EVAL     [x] OK(96565) x 1    [] Dry needle 1 or 2 Muscles (03152)  [] NMR (83726) x     [x] Dry needle 3+ Muscles (43878)  [x] Manual (01185) x 2    [] Ultrasound (49682) x  [] TA (45327) x     [] Mech Traction (18493)  [] ES(attended) (75715)     [] ES (un) (78171):   [] Vasopump (29130) [] Ionto (91207)   [] Other:    GOALSGOALS:  Patient stated goal: \" I want to have less pain \"  []? Progressing: []? Met: []? Not Met: []? Adjusted     Therapist goals for Patient:   Short Term Goals: To be achieved in: 2 weeks  1. Independent in HEP and progression per patient tolerance, in order to prevent re-injury. []? Progressing: []? Met: []? Not Met: []? Adjusted  2. Patient will have a decrease in pain to facilitate improvement in movement, function, and ADLs as indicated by Functional Deficits. []? Progressing: []? Met: []? Not Met: []? Adjusted     Long Term Goals: To be achieved in: 8 weeks  1. Disability index score of 25%% or less for the NDI to assist with reaching prior level of function. []? Progressing: []? Met: []? Not Met: []? Adjusted  2. Patient will demonstrate increased AROM to [facility] of cervical/thoracic spine to allow for proper joint functioning as indicated by patients Functional Deficits. []? Progressing: []? Met: []? Not Met: []? Adjusted  3. Patient will demonstrate an increase in postural awareness and control and activation of  Deep cervical stabilizers to allow for proper functional mobility as indicated by patients Functional Deficits. []? Progressing: []? Met: []? Not Met: []? Adjusted  4. Patient will return to 75 functional activities without increased symptoms or restriction. []? Progressing: []? Met: []? Not Met: []? Adjusted  5. (patient specific functional goal)    []? Progressing: []? Met: []? Not Met: []?  Adjusted         ASSESSMENT:  See eval    Treatment/Activity Tolerance:  [x] Patient tolerated treatment well [] Patient limited by fatique  [] Patient limited by pain  [] Patient limited by other medical complications  [] Other:     Overall Progression Towards Functional goals/ Treatment Progress Update:  [] Patient is progressing as expected towards functional goals listed. [] Progression is slowed due to complexities/Impairments listed. [] Progression has been slowed due to co-morbidities. [x] Plan just implemented, too soon to assess goals progression <30days   [] Goals require adjustment due to lack of progress  [] Patient is not progressing as expected and requires additional follow up with physician  [] Other    Prognosis for POC: [x] Good [] Fair  [] Poor    Patient requires continued skilled intervention: [x] Yes  [] No        PLAN: Cervical, Thoracic, scapular, shoulder rom, strength, mfr, joint mobs, postural exercises,  stretches, modalities, patient education , home exercise plan, cervical traction, work, resting, and sleeping positions, may do dn      [] Continue per plan of care [] Alter current plan (see comments)  [x] Plan of care initiated [] Hold pending MD visit [] Discharge    Electronically signed by: Inge Patel PT    Note: If patient does not return for scheduled/recommended follow up visits, this note will serve as a discharge from care along with the most recent update on progress.

## 2021-09-17 ENCOUNTER — HOSPITAL ENCOUNTER (OUTPATIENT)
Dept: PHYSICAL THERAPY | Age: 51
Setting detail: THERAPIES SERIES
Discharge: HOME | End: 2021-09-17
Payer: COMMERCIAL

## 2021-09-17 PROCEDURE — 97110 THERAPEUTIC EXERCISES: CPT

## 2021-09-17 PROCEDURE — 97140 MANUAL THERAPY 1/> REGIONS: CPT

## 2021-09-17 NOTE — FLOWSHEET NOTE
[address] [facility] [address] [place] [address]  Phone: [phone]   Fax:     [phone]    Physical Therapy Treatment Note/ Progress Report:     Date:  2021    Patient Name:  Magui Gannon    :  1970  MRN: [id number]    Pertinent Medical History: Additional Pertinent Hx: hypoglycemia    Medical/Treatment Diagnosis Information:  · Diagnosis: S16. 1XXA (ICD-10-CM) - Strain of neck muscle, initial hocavjfotT63.019A (ICD-10-CM) - Thoracic myofascial strain, initial [id number] (ICD-10-CM) - Strain of right shoulder, initial encounter  · Treatment Diagnosis: decreased function    Insurance/Certification information:  PT Insurance Information: generic auto  Physician Information:  Referring Practitioner: Evia Scheuermann  Plan of care signed (Y/N): routed  Date of Patient follow up with Physician:      Progress Report: []  Yes  [x]  No     Date Range for reporting period:  Beginnin2021  Ending:     Progress report due (10 Rx/or 30 days whichever is less):      Recertification due (POC duration/ or 90 days whichever is less):      Visit # Insurance/POC Allowable Auth Needed   3 12 []Yes    []No     Functional Outcomes Measure:    Date Assessed: at [facility]  ndi- 18    Pain level:  3-5/10     SUBJECTIVE:  Pt states, \" I am still really sore with certain things \"  3/90/56  Pt states, \" Doing ok, still sore, not quite as bad \" Continues to pop a lot \"  21  Pt states, \" Needling seemed to help \"  OBJECTIVE:   :   CERV ROM       Cervical Flexion 25 Pain with all motions on the right   Cervical Extension 10       Left Right   Cervical SB 10 10   Cervical rotation 30 35   Quadrant   +   Dorsal Glide        UE ROM Left Right   Shoulder Flex wfl 90 pain in thoracic, cerv, traps   Shoulder Abd   70   Shoulder ER   50   Shoulder IR   50   Elbow flex/ext   wfl   Wrist flex/ext/pro/sup       Finger flex/ext/opposition       Shoulder AROM WNL w OP       UE Strength  Left Right   Shoulder Flex   3+   Shoulder Scap   3+   Shoulder ABd (C5 Axillary)   3+   Shoulder ER    3+   Shoulder IR   3+           RESTRICTIONS/PRECAUTIONS:     Exercises/Interventions:   Therapeutic Ex (62912)  Min: Resistance/Repetitions Notes   cerv isos Rot x 20    Traps stretch 30 x 3    rotation     Add/ext     bilat ext Green x 30    Ir/er Green x 30    cerv prom All ranges         Manual Intervention (26218)  Min:30 Mfr to bilat tram, traps, pa's to thoracic g r 4, mfr to right shoulder muscles, prom all directions, iastm to lumbar, thor, cerv tram, traps,  Medi cups to the same x 3 min, sl thoracic and lumbar rotation mob gr 4    Cerv mobs/manip     Thoracic mobs/manip     CT manip     Rib mobilizations     STM          NMR re-education (66897)  Min:               Therapeutic Activity (93517)  Min:               Modalities  Min:                  Other Therapeutic Activities:  Pt was educated on PT POC, Diagnosis, Prognosis, pathomechanics as well as frequency and duration of scheduling future physical therapy appointments. Time was also taken on this day to answer all patient questions and participation in PT. Reviewed appointment policy in detail with patient and patient verbalized understanding. Home Exercise Program:Patient demonstrated proper technique, good tolerance,  and was given written instructions for the above exercises  Access Code: [email or web address]  URL: [email or web address]  Date: 09/09/2021  Prepared by: Seda Ricardo    Exercises  Supine Cervical Rotation AROM on Pillow - 1 x daily - 7 x weekly - 3 sets - 10 reps  Hooklying Isometric Upper Neck Rotation - 1 x daily - 7 x weekly - 3 sets - 10 reps  Seated Shoulder Shrug Circles AROM Forward - 1 x daily - 7 x weekly - 3 sets - 10 reps  Modified Sidelying Thoracic Rotation - 1 x daily - 7 x weekly - 3 sets - 10 reps  Seated Scapular Retraction - 1 x daily - 7 x weekly - 3 sets - 10 reps  Access Code: [email or web address]  URL: [email or web address]  Date: 09/17/2021  Prepared by: [employer]    Access Code: [email or web address]  URL: [email or web address]/  Date: 09/17/2021  Prepared by: [employer]    Exercises  Standing Shoulder Extension - 1 x daily - 7 x weekly - 3 sets - 10 reps  Standing Row with Resistance - 1 x daily - 7 x weekly - 3 sets - 10 reps  Plank with Thoracic Rotation on Counter - 1 x daily - 7 x weekly - 3 sets - 10 reps        Therapeutic Exercise and NMR EXR  [] (14778) Provided verbal/tactile cueing for activities related to strengthening, flexibility, endurance, ROM  for improvements in cervical, postural, scapular, scapulothoracic and UE control with self care, reaching, carrying, lifting, house/yardwork, driving/computer work.    [] (75596) Provided verbal/tactile cueing for activities related to improving balance, coordination, kinesthetic sense, posture, motor skill, proprioception  to assist with cervical, scapular, scapulothoracic and UE control with self care, reaching, carrying, lifting, house/yardwork, driving/computer work. Therapeutic Activities:    [] (22475 or 17424) Provided verbal/tactile cueing for activities related to improving balance, coordination, kinesthetic sense, posture, motor skill, proprioception and motor activation to allow for proper function of cervical, scapular, scapulothoracic and UE control with self care, carrying, lifting, driving/computer work.      Home Exercise Program:    [] (91424) Reviewed/Progressed HEP activities related to strengthening, flexibility, endurance, ROM of cervical, scapular, scapulothoracic and UE control with self care, reaching, carrying, lifting, house/yardwork, driving/computer work  [] (20183) Reviewed/Progressed HEP activities related to improving balance, coordination, kinesthetic sense, posture, motor skill, proprioception of cervical, scapular, scapulothoracic and UE control with self care, reaching, carrying, lifting, house/yardwork, driving/computer work      Manual Treatments:  PROM / STM / Oscillations-Mobs:  G-I, II, III, IV (PA's, Inf., Post.)  [] (58045) Provided manual therapy to mobilize soft tissue/joints of cervical/CT, scapular GHJ and UE for the purpose of decreasing headache, modulating pain, promoting relaxation,  increasing ROM, reducing/eliminating soft tissue swelling/inflammation/restriction, improving soft tissue extensibility and allowing for proper ROM for normal function with self care, reaching, carrying, lifting, house/yardwork, driving/computer work  Spoke with [employer],  regarding the use of Dry Needling         If [facility] Please Indicate Time In/Out  CPT Code Time in Time out                                   Approval Dates:  CPT Code Units Approved Units Used  Date Updated:                     Charges:  Timed Code Treatment Minutes: 45   Total Treatment Minutes: 50     [] EVAL (LOW) 88635 (typically 20 minutes face-to-face)  [] EVAL (MOD) 90355 (typically 30 minutes face-to-face)  [] EVAL (HIGH) 43423 (typically 45 minutes face-to-face)  [] RE-EVAL     [x] XE(81350) x 1    [] Dry needle 1 or 2 Muscles (45577)  [] NMR (81660) x     [x] Dry needle 3+ Muscles (23767)  [x] Manual (70401) x 2    [] Ultrasound (28735) x  [] TA (59197) x     [] Mech Traction (84339)  [] ES(attended) (93625)     [] ES (un) (12457):   [] Vasopump (03509) [] Ionto (51525)   [] Other:    Derrill Limbo:  Patient stated goal: \" I want to have less pain \"  []? Progressing: []? Met: []? Not Met: []? Adjusted     Therapist goals for Patient:   Short Term Goals: To be achieved in: 2 weeks  1. Independent in HEP and progression per patient tolerance, in order to prevent re-injury. []? Progressing: []? Met: []? Not Met: []? Adjusted  2. Patient will have a decrease in pain to facilitate improvement in movement, function, and ADLs as indicated by Functional Deficits. []? Progressing: []? Met: []?  Not Met: []? Adjusted     Long Term Goals: To be achieved in: 8 weeks  1. Disability index score of 25%% or less for the NDI to assist with reaching prior level of function. []? Progressing: []? Met: []? Not Met: []? Adjusted  2. Patient will demonstrate increased AROM to [facility] of cervical/thoracic spine to allow for proper joint functioning as indicated by patients Functional Deficits. []? Progressing: []? Met: []? Not Met: []? Adjusted  3. Patient will demonstrate an increase in postural awareness and control and activation of  Deep cervical stabilizers to allow for proper functional mobility as indicated by patients Functional Deficits. []? Progressing: []? Met: []? Not Met: []? Adjusted  4. Patient will return to 75 functional activities without increased symptoms or restriction. []? Progressing: []? Met: []? Not Met: []? Adjusted  5. (patient specific functional goal)    []? Progressing: []? Met: []? Not Met: []? Adjusted         ASSESSMENT:  See eval    Treatment/Activity Tolerance:  [x] Patient tolerated treatment well [] Patient limited by fatique  [] Patient limited by pain  [] Patient limited by other medical complications  [] Other:     Overall Progression Towards Functional goals/ Treatment Progress Update:  [] Patient is progressing as expected towards functional goals listed. [] Progression is slowed due to complexities/Impairments listed. [] Progression has been slowed due to co-morbidities.   [x] Plan just implemented, too soon to assess goals progression <30days   [] Goals require adjustment due to lack of progress  [] Patient is not progressing as expected and requires additional follow up with physician  [] Other    Prognosis for POC: [x] Good [] Fair  [] Poor    Patient requires continued skilled intervention: [x] Yes  [] No        PLAN: Cervical, Thoracic, scapular, shoulder rom, strength, mfr, joint mobs, postural exercises,  stretches, modalities, patient education , home exercise plan, cervical traction, work, resting, and sleeping positions, may do dn      [] Continue per plan of care [] Alter current plan (see comments)  [x] Plan of care initiated [] Hold pending MD visit [] Discharge    Electronically signed by: Pete Kaplan PT    Note: If patient does not return for scheduled/recommended follow up visits, this note will serve as a discharge from care along with the most recent update on progress.

## 2021-09-20 ENCOUNTER — TELEPHONE (OUTPATIENT)
Dept: FAMILY MEDICINE CLINIC | Age: 51
End: 2021-09-20

## 2021-09-20 NOTE — TELEPHONE ENCOUNTER
Pt will need a physical to have all of this stuff ordered. She was seen by matthew in august for a muscle strain and then before that wasn't seen since 2018 that was her new pt appt.
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
## 2021-09-21 ENCOUNTER — HOSPITAL ENCOUNTER (OUTPATIENT)
Dept: PHYSICAL THERAPY | Age: 51
Setting detail: THERAPIES SERIES
Discharge: HOME | End: 2021-09-21
Payer: COMMERCIAL

## 2021-09-21 ENCOUNTER — OFFICE VISIT (OUTPATIENT)
Dept: FAMILY MEDICINE CLINIC | Age: 51
End: 2021-09-21
Payer: COMMERCIAL

## 2021-09-21 VITALS
RESPIRATION RATE: 18 BRPM | WEIGHT: 158 LBS | HEIGHT: 63 IN | SYSTOLIC BLOOD PRESSURE: 105 MMHG | BODY MASS INDEX: 28 KG/M2 | HEART RATE: 67 BPM | DIASTOLIC BLOOD PRESSURE: 80 MMHG | OXYGEN SATURATION: 96 %
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
## 2021-09-21 DIAGNOSIS — T14.8XXA MUSCULOSKELETAL STRAIN: ICD-10-CM

## 2021-09-21 DIAGNOSIS — S13.9XXD NECK SPRAIN, SUBSEQUENT ENCOUNTER: ICD-10-CM

## 2021-09-21 PROCEDURE — 97110 THERAPEUTIC EXERCISES: CPT

## 2021-09-21 PROCEDURE — 99213 OFFICE O/P EST LOW 20 MIN: CPT | Performed by: INTERNAL MEDICINE

## 2021-09-21 PROCEDURE — 97140 MANUAL THERAPY 1/> REGIONS: CPT

## 2021-09-21 PROCEDURE — 20561 NDL INSJ W/O NJX 3+ MUSC: CPT
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
## 2021-09-23 ENCOUNTER — HOSPITAL ENCOUNTER (OUTPATIENT)
Dept: PHYSICAL THERAPY | Age: 51
Setting detail: THERAPIES SERIES
Discharge: HOME | End: 2021-09-23
Payer: COMMERCIAL

## 2021-09-23 ENCOUNTER — HOSPITAL ENCOUNTER (OUTPATIENT)
Dept: MAMMOGRAPHY | Age: 51
Discharge: HOME | End: 2021-09-28
Payer: COMMERCIAL

## 2021-09-23 VITALS — HEIGHT: 63 IN | WEIGHT: 152 LBS | BODY MASS INDEX: 26.93 KG/M2

## 2021-09-23 DIAGNOSIS — Z12.31 VISIT FOR SCREENING MAMMOGRAM: ICD-10-CM

## 2021-09-23 PROCEDURE — 77063 BREAST TOMOSYNTHESIS BI: CPT

## 2021-09-23 PROCEDURE — 97140 MANUAL THERAPY 1/> REGIONS: CPT

## 2021-09-23 PROCEDURE — 20561 NDL INSJ W/O NJX 3+ MUSC: CPT

## 2021-09-23 PROCEDURE — 97110 THERAPEUTIC EXERCISES: CPT

## 2021-09-23 NOTE — FLOWSHEET NOTE
[address] [facility] [address] [address]  Phone: [phone]   Fax:     [phone]    Physical Therapy Treatment Note/ Progress Report:     Date:  2021    Patient Name:  Alaina Rosa    :  1970  MRN: [id number]    Pertinent Medical History: Additional Pertinent Hx: hypoglycemia    Medical/Treatment Diagnosis Information:  · Diagnosis: S16. 1XXA (ICD-10-CM) - Strain of neck muscle, initial [id number] (ICD-10-CM) - Thoracic myofascial strain, initial [id number] (ICD-10-CM) - Strain of right shoulder, initial encounter  · Treatment Diagnosis: decreased function    Insurance/Certification information:  PT Insurance Information: generic auto  Physician Information:  Referring Practitioner: Sheila Newman  Plan of care signed (Y/N): routed  Date of Patient follow up with Physician:      Progress Report: []  Yes  [x]  No     Date Range for reporting period:  Beginnin2021  Ending:     Progress report due (10 Rx/or 30 days whichever is less):      Recertification due (POC duration/ or 90 days whichever is less):      Visit # Insurance/POC Allowable Auth Needed   5 12 []Yes    []No     Functional Outcomes Measure:    Date Assessed: at eval  ndi- 18    Pain level:  3-5/10     SUBJECTIVE:  Pt states, \" I am still really sore with certain things \"  3/96/39  Pt states, \" Doing ok, still sore, not quite as bad \" Continues to pop a lot \"  21  Pt states, \" Needling seemed to help \"  21  Pt states, \" Improving, moving better, not popping as much \"   21  Pt states, \" Much better overall, I have one spot on the right side that is still painful \"  OBJECTIVE:   :   CERV ROM       Cervical Flexion 25 Pain with all motions on the right   Cervical Extension 10       Left Right   Cervical SB 10 10   Cervical rotation 30 35   Quadrant   +   Dorsal Glide        UE ROM Left Right   Shoulder Flex wfl 90 pain in thoracic, cerv, traps   Shoulder Abd   70   Shoulder ER   50   Shoulder IR   50   Elbow flex/ext   wfl   Wrist flex/ext/pro/sup       Finger flex/ext/opposition       Shoulder AROM WNL w OP       UE Strength  Left Right   Shoulder Flex   3+   Shoulder Scap   3+   Shoulder ABd (C5 Axillary)   3+   Shoulder ER    3+   Shoulder IR   3+       Cerv rom- approaching normal, pain in right traps, supra area with left sb    RESTRICTIONS/PRECAUTIONS:     Exercises/Interventions:   Therapeutic Ex (94235)  Min: Resistance/Repetitions Notes   cerv isos Rot x 20    Traps stretch 30 x 3    rotation     Add/ext     bilat ext Green x 30    Ir/er Green x 30    cerv prom All ranges    hep 9/23/21  Reviewed and added    Manual Intervention ([id number])  Min15 Mfr to bilat tram, traps, pa's to thoracic g r 4, mfr to right shoulder muscles, prom all directions, iastm to lumbar, thor, cerv tram, traps,  Medi cups to the same x 3 min, sl thoracic and lumbar rotation mob gr 4, right 1st rib mob, tp relwease to right scm, 1st rib manipulation on the right    Cerv mobs/manip     Thoracic mobs/manip     CT manip     Rib mobilizations     STM          NMR re-education (79704)  Min:               Therapeutic Activity (26796)  Min:               Modalities  Min:                  Other Therapeutic Activities:  Pt was educated on PT POC, Diagnosis, Prognosis, pathomechanics as well as frequency and duration of scheduling future physical therapy appointments. Time was also taken on this day to answer all patient questions and participation in PT. Reviewed appointment policy in detail with patient and patient verbalized understanding. Home Exercise Program:Patient demonstrated proper technique, good tolerance,  and was given written instructions for the above exercises  Access Code: [email or web address]  URL: [email or web address]  Date: 09/09/2021  Prepared by: Parabel    Exercises  Supine Cervical Rotation AROM on Pillow - 1 x daily - 7 x weekly - 3 sets - 10 reps  Hooklying Isometric Upper Neck Rotation - 1 x daily - 7 x weekly - 3 sets - 10 reps  Seated Shoulder Shrug Circles AROM Forward - 1 x daily - 7 x weekly - 3 sets - 10 reps  Modified Sidelying Thoracic Rotation - 1 x daily - 7 x weekly - 3 sets - 10 reps  Seated Scapular Retraction - 1 x daily - 7 x weekly - 3 sets - 10 reps  Access Code: [email or web address]  URL: [email or web address]  Date: 09/17/2021  Prepared by: Wendel Aase    Access Code: [email or web address]  URL: [email or web address]  Date: 09/17/2021  Prepared by: Wendel Aase    Exercises  Standing Shoulder Extension - 1 x daily - 7 x weekly - 3 sets - 10 reps  Standing Row with Resistance - 1 x daily - 7 x weekly - 3 sets - 10 reps  Plank with Thoracic Rotation on Counter - 1 x daily - 7 x weekly - 3 sets - 10 reps  Access Code: [email or web address]  URL: [email or web address]  Date: 09/23/2021  Prepared by: Wendel Aase    Exercises  Child's Pose with Thread the Needle - 1 x daily - 7 x weekly - 3 sets - 10 reps  Quadruped Thoracic Rotation Full Range with Hand on Neck - 1 x daily - 7 x weekly - 3 sets - 10 reps  Seated Cervical Sidebending Stretch - 1 x daily - 7 x weekly - 3 sets - 10 reps  Seated Levator Scapulae Stretch - 1 x daily - 7 x weekly - 3 sets - 10 reps        Therapeutic Exercise and NMR EXR  [] (19133) Provided verbal/tactile cueing for activities related to strengthening, flexibility, endurance, ROM  for improvements in cervical, postural, scapular, scapulothoracic and UE control with self care, reaching, carrying, lifting, house/yardwork, driving/computer work.    [] (74253) Provided verbal/tactile cueing for activities related to improving balance, coordination, kinesthetic sense, posture, motor skill, proprioception  to assist with cervical, scapular, scapulothoracic and UE control with self care, reaching, carrying, lifting, house/yardwork, driving/computer work.     Therapeutic Activities:    [] (31575 or ) Provided verbal/tactile cueing for activities related to improving balance, coordination, kinesthetic sense, posture, motor skill, proprioception and motor activation to allow for proper function of cervical, scapular, scapulothoracic and UE control with self care, carrying, lifting, driving/computer work. Home Exercise Program:    [] (75236) Reviewed/Progressed HEP activities related to strengthening, flexibility, endurance, ROM of cervical, scapular, scapulothoracic and UE control with self care, reaching, carrying, lifting, house/yardwork, driving/computer work  [] (90191) Reviewed/Progressed HEP activities related to improving balance, coordination, kinesthetic sense, posture, motor skill, proprioception of cervical, scapular, scapulothoracic and UE control with self care, reaching, carrying, lifting, house/yardwork, driving/computer work      Manual Treatments:  PROM / STM / Oscillations-Mobs:  G-I, II, III, IV (PA's, Inf., Post.)  [] (46450) Provided manual therapy to mobilize soft tissue/joints of cervical/CT, scapular GHJ and UE for the purpose of decreasing headache, modulating pain, promoting relaxation,  increasing ROM, reducing/eliminating soft tissue swelling/inflammation/restriction, improving soft tissue extensibility and allowing for proper ROM for normal function with self care, reaching, carrying, lifting, house/yardwork, driving/computer work  Spoke with [employer],  regarding the use of Dry Needling     Dry needling manual therapy: consisted on the placement of 4 needles in the following muscles: , right traps, , supraspinatus, infraspinatus, , .  A 40mm  mm needle was inserted, piston, rotated, and coned to produce intramuscular mobilization. These techniques were used to restore functional range of motion, reduce muscle spasm and induce healing in the corresponding musculature. (92563)  Clean Technique was utilized today while applying Dry needling treatment.   The treatment sites where cleaned with 70% solution of  isopropyl alcohol . The PT washed their hands and utilized treatment gloves along with hand  prior to inserting the needles. All needles where removed and discarded in the appropriate sharps container. MD has given verbal and/or written approval for this treatment. Attended low frequency (1-20Hz) electrical stimulation was utilized in conjunction with Dry Needling:  the Estim was manipulated between all above needles for a period of 10 min. at 4 volts. In multifidi  The low frequency electrical stimulation was used to help reduce muscle spasm and help to interrupt /Turney the pain cycle. (87923)   If [facility] Please Indicate Time In/Out  CPT Code Time in Time out                                   Approval Dates:  CPT Code Units Approved Units Used  Date Updated:                     Charges:  Timed Code Treatment Minutes: 45   Total Treatment Minutes: 50     [] EVAL (LOW) 88228 (typically 20 minutes face-to-face)  [] EVAL (MOD) 82930 (typically 30 minutes face-to-face)  [] EVAL (HIGH) 70462 (typically 45 minutes face-to-face)  [] RE-EVAL     [x] TW(35554) x 1    [] Dry needle 1 or 2 Muscles (03080)  [] NMR (13625) x     [x] Dry needle 3+ Muscles (20142)  [x] Manual (17724) x1    [] Ultrasound (66285) x  [] TA (35568) x     [] Mech Traction (17402)  [] ES(attended) (85894)     [] ES (un) (03120):   [] Vasopump (68957) [] Ionto (67697)   [] Other:    Dossie Fong:  Patient stated goal: \" I want to have less pain \"  [x]? Progressing: []? Met: []? Not Met: []? Adjusted     Therapist goals for Patient:   Short Term Goals: To be achieved in: 2 weeks  1. Independent in HEP and progression per patient tolerance, in order to prevent re-injury. [x]? Progressing: []? Met: []? Not Met: []? Adjusted  2. Patient will have a decrease in pain to facilitate improvement in movement, function, and ADLs as indicated by Functional Deficits. [x]? Progressing: []? Met: []?  Not Met: []? Adjusted     Long Term Goals: To be achieved in: 8 weeks  1. Disability index score of 25%% or less for the NDI to assist with reaching prior level of function. [x]? Progressing: []? Met: []? Not Met: []? Adjusted  2. Patient will demonstrate increased AROM to [facility] of cervical/thoracic spine to allow for proper joint functioning as indicated by patients Functional Deficits. [x]? Progressing: []? Met: []? Not Met: []? Adjusted  3. Patient will demonstrate an increase in postural awareness and control and activation of  Deep cervical stabilizers to allow for proper functional mobility as indicated by patients Functional Deficits. [x]? Progressing: []? Met: []? Not Met: []? Adjusted  4. Patient will return to 75 functional activities without increased symptoms or restriction. [x]? Progressing: []? Met: []? Not Met: []? Adjusted  5. (patient specific functional goal)    [x]? Progressing: []? Met: []? Not Met: []? Adjusted         ASSESSMENT:  See eval    Treatment/Activity Tolerance:  [x] Patient tolerated treatment well [] Patient limited by fatique  [] Patient limited by pain  [] Patient limited by other medical complications  [x] Other: 9/23/21  Improving from all aspects    Overall Progression Towards Functional goals/ Treatment Progress Update:  [] Patient is progressing as expected towards functional goals listed. [] Progression is slowed due to complexities/Impairments listed. [] Progression has been slowed due to co-morbidities.   [x] Plan just implemented, too soon to assess goals progression <30days   [] Goals require adjustment due to lack of progress  [] Patient is not progressing as expected and requires additional follow up with physician  [] Other    Prognosis for POC: [x] Good [] Fair  [] Poor    Patient requires continued skilled intervention: [x] Yes  [] No        PLAN: Cervical, Thoracic, scapular, shoulder rom, strength, mfr, joint mobs, postural exercises,  stretches, modalities, patient education , home exercise plan, cervical traction, work, resting, and sleeping positions, may do dn      [] Continue per plan of care [] Alter current plan (see comments)  [x] Plan of care initiated [] Hold pending MD visit [] Discharge    Electronically signed by: Parminder Benavides PT    Note: If patient does not return for scheduled/recommended follow up visits, this note will serve as a discharge from care along with the most recent update on progress.

## 2021-09-27 DIAGNOSIS — R92.8 ABNORMAL MAMMOGRAM OF LEFT BREAST: Primary | ICD-10-CM

## 2021-09-28 ENCOUNTER — TELEPHONE (OUTPATIENT)
Dept: FAMILY MEDICINE CLINIC | Age: 51
End: 2021-09-28

## 2021-09-28 ENCOUNTER — HOSPITAL ENCOUNTER (OUTPATIENT)
Dept: PHYSICAL THERAPY | Age: 51
Setting detail: THERAPIES SERIES
Discharge: HOME | End: 2021-09-28
Payer: COMMERCIAL

## 2021-09-28 DIAGNOSIS — R92.8 ABNORMAL MAMMOGRAM OF LEFT BREAST: Primary | ICD-10-CM

## 2021-09-28 NOTE — TELEPHONE ENCOUNTER
Nikolay Betancourt from [facility] called stating that this pt also needs an order for a bilateral breast ultra sound to go with the mammogram order she already put in    Please Advise

## 2021-09-28 NOTE — FLOWSHEET NOTE
Physical Therapy  Cancellation/No-show Note  Patient Name:  Rc Jones  :  1970   Date:  2021  Cancelled visits to date:1  No-shows to date: 0    For today's appointment patient:  [x]  Cancelled  []  Rescheduled appointment  []  No-show     Reason given by patient:  []  Patient ill  []  Conflicting appointment  []  No transportation    []  Conflict with work  []  No reason given  []  Other:     Comments:      Electronically signed by:  Shiraz Webster PT

## 2021-09-30 ENCOUNTER — APPOINTMENT (OUTPATIENT)
Dept: PHYSICAL THERAPY | Age: 51
End: 2021-09-30
Payer: COMMERCIAL

## 2021-10-06 ENCOUNTER — HOSPITAL ENCOUNTER (OUTPATIENT)
Dept: PHYSICAL THERAPY | Age: 51
Setting detail: THERAPIES SERIES
Discharge: HOME | End: 2021-10-06
Payer: OTHER MISCELLANEOUS

## 2021-10-06 PROCEDURE — 97140 MANUAL THERAPY 1/> REGIONS: CPT

## 2021-10-06 PROCEDURE — 97110 THERAPEUTIC EXERCISES: CPT

## 2021-10-06 NOTE — FLOWSHEET NOTE
[address] [facility]. [facility] [address]  Phone: [phone]   Fax:     [phone]     Physical Therapy Discharge Summary    Dear  ,    We had the pleasure of treating the following patient for physical therapy services at [address] [place] [address]. A summary of our findings can be found in the discharge summary below. If you have any questions or concerns regarding these findings, please do not hesitate to contact me at the office phone number above. Thank you for the referral.     Physician Signature:________________________________Date:__________________  By signing above (or electronic signature), therapists plan is approved by physician      Overall Response to Treatment:   []Patient is responding well to treatment and improvement is noted with regards  to goals   []Patient should continue to improve in reasonable time if they continue HEP   []Patient has plateaued and is no longer responding to skilled PT intervention    []Patient is getting worse and would benefit from return to referring MD   []Patient unable to adhere to initial POC   []Other:     Date range of Visit-10/6  Total Visits:[address] [facility]. [facility] [address]  Phone: [phone]   Fax:     [phone]    Physical Therapy Treatment Note/ Progress Report:     Date:  10/6/2021    Patient Name:  Natalya Pang    :  1970  MRN: [id number]    Pertinent Medical History: Additional Pertinent Hx: hypoglycemia    Medical/Treatment Diagnosis Information:  · Diagnosis: S16. 1XXA (ICD-10-CM) - Strain of neck muscle, initial [id number] (ICD-10-CM) - Thoracic myofascial strain, initial [id number] (ICD-10-CM) - Strain of right shoulder, initial encounter  · Treatment Diagnosis: decreased function    Insurance/Certification information:  PT Insurance Information: generic auto  Physician Information:  Referring Practitioner: Noah Gee  Plan of care signed (Y/N): routed  Date of Patient follow up with Physician:      Progress Report: []  Yes  [x]  No     Date Range for reporting period:  Beginning: 10/6/2021  Ending:     Progress report due (10 Rx/or 30 days whichever is less): 85/8/32     Recertification due (POC duration/ or 90 days whichever is less):      Visit # Insurance/POC Allowable Auth Needed   6 12 []Yes    []No     Functional Outcomes Measure:    Date Assessed: at al  ndi- 18    Pain level:  3-5/10     SUBJECTIVE:  Pt states, \" I am still really sore with certain things \"  6/65/43  Pt states, \" Doing ok, still sore, not quite as bad \" Continues to pop a lot \"  9/17/21  Pt states, \" Needling seemed to help \"  9/21/21  Pt states, \" Improving, moving better, not popping as much \"   9/23/21  Pt states, \" Much better overall, I have one spot on the right side that is still painful \"  10/6/21  Pt states, \" Doing much better overall, still sore with lifting \"  OBJECTIVE:   :   CERV ROM       Cervical Flexion 25 Pain with all motions on the right   Cervical Extension 10       Left Right   Cervical SB 10 10   Cervical rotation 30 35   Quadrant   +   Dorsal Glide        UE ROM Left Right   Shoulder Flex wfl 90 pain in thoracic, cerv, traps   Shoulder Abd   70   Shoulder ER   50   Shoulder IR   50   Elbow flex/ext   wfl   Wrist flex/ext/pro/sup       Finger flex/ext/opposition       Shoulder AROM WNL w OP       UE Strength  Left Right   Shoulder Flex   3+   Shoulder Scap   3+   Shoulder ABd (C5 Axillary)   3+   Shoulder ER    3+   Shoulder IR   3+       Cerv rom- approaching normal, pain in right traps, supra area with left sb    RESTRICTIONS/PRECAUTIONS:     Exercises/Interventions:   Therapeutic Ex (44939)  Min: Resistance/Repetitions Notes   cerv isos Rot x 20    Traps stretch 30 x 3    rotation     lawnmower 7# x 30 ea    scaption Prone chin tuck X 2 min    Right cerv tor rotation all 4's X 20    Add/ext     bilat ext Green x 30    Ir/er Green x 30    cerv prom All ranges    hep 9/23/21  Reviewed and added    Manual Intervention (48559)  Vidhi Vang Mfr to bilat tram, traps, pa's to thoracic g r 4, mfr to right shoulder muscles, prom all directions, iastm to lumbar, thor, cerv tram, traps,   mob gr 4, right 1st rib mob, tp relwease to right scm, T2 left rotated, right rotation mobs,gr 4, t    Cerv mobs/manip     Thoracic mobs/manip     CT manip     Rib mobilizations     STM          NMR re-education (36132)  Min:               Therapeutic Activity (18046)  Min:               Modalities  Min:                  Other Therapeutic Activities:  Pt was educated on PT POC, Diagnosis, Prognosis, pathomechanics as well as frequency and duration of scheduling future physical therapy appointments. Time was also taken on this day to answer all patient questions and participation in PT. Reviewed appointment policy in detail with patient and patient verbalized understanding. Home Exercise Program:Patient demonstrated proper technique, good tolerance,  and was given written instructions for the above exercises  Access Code: [email or web address]  URL: [employer]/  Date: 09/09/2021  Prepared by: Cornelia Rosen    Exercises  Supine Cervical Rotation AROM on Pillow - 1 x daily - 7 x weekly - 3 sets - 10 reps  Hooklying Isometric Upper Neck Rotation - 1 x daily - 7 x weekly - 3 sets - 10 reps  Seated Shoulder Shrug Circles AROM Forward - 1 x daily - 7 x weekly - 3 sets - 10 reps  Modified Sidelying Thoracic Rotation - 1 x daily - 7 x weekly - 3 sets - 10 reps  Seated Scapular Retraction - 1 x daily - 7 x weekly - 3 sets - 10 reps  Access Code: [email or web address]  URL: [email or web address]  Date: 09/17/2021  Prepared by: Cornelia Rosen    Access Code: [email or web address]  URL: [employer]/  Date: 09/17/2021  Prepared by: Cornelia Rosen    Exercises  Standing Shoulder Extension - 1 x daily - 7 x weekly - 3 sets - 10 reps  Standing Row with Resistance - 1 x daily - 7 x weekly - 3 sets - 10 reps  Plank with Thoracic Rotation on Counter - 1 x daily - 7 x weekly - 3 sets - 10 reps  Access Code: [email or web address]  URL: [email or web address]  Date: 09/23/2021  Prepared by: Janet Tubbs    Exercises  Child's Pose with Thread the Needle - 1 x daily - 7 x weekly - 3 sets - 10 reps  Quadruped Thoracic Rotation Full Range with Hand on Neck - 1 x daily - 7 x weekly - 3 sets - 10 reps  Seated Cervical Sidebending Stretch - 1 x daily - 7 x weekly - 3 sets - 10 reps  Seated Levator Scapulae Stretch - 1 x daily - 7 x weekly - 3 sets - 10 reps        Therapeutic Exercise and NMR EXR  [] (34170) Provided verbal/tactile cueing for activities related to strengthening, flexibility, endurance, ROM  for improvements in cervical, postural, scapular, scapulothoracic and UE control with self care, reaching, carrying, lifting, house/yardwork, driving/computer work.    [] (09780) Provided verbal/tactile cueing for activities related to improving balance, coordination, kinesthetic sense, posture, motor skill, proprioception  to assist with cervical, scapular, scapulothoracic and UE control with self care, reaching, carrying, lifting, house/yardwork, driving/computer work. Therapeutic Activities:    [] (99692 or 60971) Provided verbal/tactile cueing for activities related to improving balance, coordination, kinesthetic sense, posture, motor skill, proprioception and motor activation to allow for proper function of cervical, scapular, scapulothoracic and UE control with self care, carrying, lifting, driving/computer work.      Home Exercise Program:    [] (13401) Reviewed/Progressed HEP activities related to strengthening, flexibility, endurance, ROM of cervical, scapular, scapulothoracic and UE control with self care, reaching, carrying, lifting, house/yardwork, driving/computer work  [] (68056) Reviewed/Progressed HEP activities related to improving balance, coordination, kinesthetic sense, posture, motor skill, proprioception of cervical, scapular, scapulothoracic and UE control with self care, reaching, carrying, lifting, house/yardwork, driving/computer work      Manual Treatments:  PROM / STM / Oscillations-Mobs:  G-I, II, III, IV (PA's, Inf., Post.)  [] (87595) Provided manual therapy to mobilize soft tissue/joints of cervical/CT, scapular GHJ and UE for the purpose of decreasing headache, modulating pain, promoting relaxation,  increasing ROM, reducing/eliminating soft tissue swelling/inflammation/restriction, improving soft tissue extensibility and allowing for proper ROM for normal function with self care, reaching, carrying, lifting, house/yardwork, driving/computer work  Spoke with HEMINGWAY,  regarding the use of Dry Needling     Dry needling manual therapy: consisted on the placement of 4 needles in the following muscles: , right traps, , supraspinatus, infraspinatus, , .  A 40mm  mm needle was inserted, piston, rotated, and coned to produce intramuscular mobilization. These techniques were used to restore functional range of motion, reduce muscle spasm and induce healing in the corresponding musculature. (67790)  Clean Technique was utilized today while applying Dry needling treatment. The treatment sites where cleaned with 70% solution of  isopropyl alcohol . The PT washed their hands and utilized treatment gloves along with hand  prior to inserting the needles. All needles where removed and discarded in the appropriate sharps container. MD has given verbal and/or written approval for this treatment. Attended low frequency (1-20Hz) electrical stimulation was utilized in conjunction with Dry Needling:  the Estim was manipulated between all above needles for a period of 10 min. at 4 volts.  In multifidi  The low frequency electrical stimulation was used to help reduce muscle spasm and help to interrupt /[place] the pain cycle. (79899)   If [facility] Please Indicate Time In/Out  CPT Code Time in Time out                                   Approval Dates:  CPT Code Units Approved Units Used  Date Updated:                     Charges:  Timed Code Treatment Minutes: 45   Total Treatment Minutes: 50     [] EVAL (LOW) 05657 (typically 20 minutes face-to-face)  [] EVAL (MOD) 55633 (typically 30 minutes face-to-face)  [] EVAL (HIGH) 98223 (typically 45 minutes face-to-face)  [] RE-EVAL     [x] DY(28033) x 1    [] Dry needle 1 or 2 Muscles (16472)  [] NMR (45700) x     [] Dry needle 3+ Muscles (46499)  [x] Manual (42505) x2   [] Ultrasound (92488) x  [] TA (87426) x     [] Mech Traction (65139)  [] ES(attended) (65811)     [] ES (un) (80903):   [] Vasopump (88037) [] Ionto (16896)   [] Other:    Sandy Bruno:  Patient stated goal: \" I want to have less pain \"  []? Progressing: [x]? Met: []? Not Met: []? Adjusted     Therapist goals for Patient:   Short Term Goals: To be achieved in: 2 weeks  1. Independent in HEP and progression per patient tolerance, in order to prevent re-injury. []? Progressing: [x]? Met: []? Not Met: []? Adjusted  2. Patient will have a decrease in pain to facilitate improvement in movement, function, and ADLs as indicated by Functional Deficits. []? Progressing: [x]? Met: []? Not Met: []? Adjusted     Long Term Goals: To be achieved in: 8 weeks  1. Disability index score of 25%% or less for the NDI to assist with reaching prior level of function. []? Progressing: [x]? Met: []? Not Met: []? Adjusted  2. Patient will demonstrate increased AROM to [facility] of cervical/thoracic spine to allow for proper joint functioning as indicated by patients Functional Deficits. []? Progressing: [x]? Met: []? Not Met: []? Adjusted  3.  Patient will demonstrate an increase in postural awareness and control and activation of  Deep cervical stabilizers to allow for proper functional mobility as indicated by patients Functional Deficits. []? Progressing: [x]? Met: []? Not Met: []? Adjusted  4. Patient will return to 75 functional activities without increased symptoms or restriction. []? Progressing: [x]? Met: []? Not Met: []? Adjusted  5. (patient specific functional goal)    []? Progressing: [x]? Met: []? Not Met: []? Adjusted         ASSESSMENT:  Pt was seen for 6 rx. She is doing much better overall. She still has some soreness and decreased function as well as a few trigger points . She is much better then when we started. She will continue with her hep and will return if her pain begins to increase. DCPT    Treatment/Activity Tolerance:  [x] Patient tolerated treatment well [] Patient limited by fatique  [] Patient limited by pain  [] Patient limited by other medical complications  [x] Other: 9/23/21  Improving from all aspects    Overall Progression Towards Functional goals/ Treatment Progress Update:  [x] Patient is progressing as expected towards functional goals listed. [] Progression is slowed due to complexities/Impairments listed. [] Progression has been slowed due to co-morbidities.   [x] Plan just implemented, too soon to assess goals progression <30days   [] Goals require adjustment due to lack of progress  [] Patient is not progressing as expected and requires additional follow up with physician  [] Other    Prognosis for POC: [x] Good [] Fair  [] Poor    Patient requires continued skilled intervention: [x] Yes  [] No        PLAN: Cervical, Thoracic, scapular, shoulder rom, strength, mfr, joint mobs, postural exercises,  stretches, modalities, patient education , home exercise plan, cervical traction, work, resting, and sleeping positions, may do dn      [] Continue per plan of care [] Alter current plan (see comments)  [x] Plan of care initiated [] Hold pending MD visit [] Discharge    Electronically signed by: Jemima Montenegro PT    Note: If patient does not return for scheduled/recommended follow up visits, this note will serve as a discharge from care along with the most recent update on progress.

## 2021-10-08 ENCOUNTER — APPOINTMENT (OUTPATIENT)
Dept: PHYSICAL THERAPY | Age: 51
End: 2021-10-08
Payer: OTHER MISCELLANEOUS

## 2021-10-13 ENCOUNTER — HOSPITAL ENCOUNTER (OUTPATIENT)
Dept: WOMENS IMAGING | Age: 51
Discharge: HOME | End: 2021-10-13
Payer: COMMERCIAL

## 2021-10-13 ENCOUNTER — HOSPITAL ENCOUNTER (OUTPATIENT)
Dept: ULTRASOUND IMAGING | Age: 51
Discharge: HOME | End: 2021-10-13
Payer: COMMERCIAL

## 2021-10-13 DIAGNOSIS — R92.8 ABNORMAL MAMMOGRAM OF LEFT BREAST: ICD-10-CM

## 2021-10-13 DIAGNOSIS — R92.8 ABNORMAL MAMMOGRAM: ICD-10-CM

## 2021-10-13 PROCEDURE — G0279 TOMOSYNTHESIS, MAMMO: HCPCS

## 2021-10-13 PROCEDURE — 76642 ULTRASOUND BREAST LIMITED: CPT

## 2021-10-15 ENCOUNTER — OFFICE VISIT (OUTPATIENT)
Dept: FAMILY MEDICINE CLINIC | Age: 51
End: 2021-10-15
Payer: COMMERCIAL

## 2021-10-15 VITALS
DIASTOLIC BLOOD PRESSURE: 64 MMHG | WEIGHT: 155 LBS | SYSTOLIC BLOOD PRESSURE: 118 MMHG | HEIGHT: 63 IN | BODY MASS INDEX: 27.46 KG/M2 | HEART RATE: 100 BPM | OXYGEN SATURATION: 99 %

## 2021-10-15 DIAGNOSIS — Z11.4 SCREENING FOR HIV (HUMAN IMMUNODEFICIENCY VIRUS): ICD-10-CM

## 2021-10-15 DIAGNOSIS — Z23 FLU VACCINE NEED: ICD-10-CM

## 2021-10-15 DIAGNOSIS — Z11.59 NEED FOR HEPATITIS C SCREENING TEST: ICD-10-CM

## 2021-10-15 DIAGNOSIS — Z00.00 WELL ADULT EXAM: ICD-10-CM

## 2021-10-15 DIAGNOSIS — Z12.11 COLON CANCER SCREENING: ICD-10-CM

## 2021-10-15 DIAGNOSIS — E03.8 SUBCLINICAL HYPOTHYROIDISM: ICD-10-CM

## 2021-10-15 DIAGNOSIS — N39.3 STRESS INCONTINENCE: ICD-10-CM

## 2021-10-15 DIAGNOSIS — Z12.4 CERVICAL CANCER SCREENING: ICD-10-CM

## 2021-10-15 LAB
A/G RATIO: 2 (ref 1.1–2.2)
ALBUMIN SERPL-MCNC: 4.7 G/DL (ref 3.4–5)
ALP BLD-CCNC: 75 U/L (ref 40–129)
ALT SERPL-CCNC: 18 U/L (ref 10–40)
ANION GAP SERPL CALCULATED.3IONS-SCNC: 14 MMOL/L (ref 3–16)
AST SERPL-CCNC: 17 U/L (ref 15–37)
BILIRUB SERPL-MCNC: 0.5 MG/DL (ref 0–1)
BUN BLDV-MCNC: 12 MG/DL (ref 7–20)
CALCIUM SERPL-MCNC: 9.4 MG/DL (ref 8.3–10.6)
CHLORIDE BLD-SCNC: 105 MMOL/L (ref 99–110)
CHOLESTEROL, TOTAL: 187 MG/DL (ref 0–199)
CO2: 22 MMOL/L (ref 21–32)
CREAT SERPL-MCNC: 0.7 MG/DL (ref 0.6–1.1)
GFR AFRICAN AMERICAN: >60
GFR NON-AFRICAN AMERICAN: >60
GLOBULIN: 2.4 G/DL
GLUCOSE FASTING: 85 MG/DL (ref 70–99)
HDLC SERPL-MCNC: 44 MG/DL (ref 40–60)
HEPATITIS C ANTIBODY INTERPRETATION: NORMAL
LDL CHOLESTEROL CALCULATED: 115 MG/DL
POTASSIUM SERPL-SCNC: 4.3 MMOL/L (ref 3.5–5.1)
SODIUM BLD-SCNC: 141 MMOL/L (ref 136–145)
TOTAL PROTEIN: 7.1 G/DL (ref 6.4–8.2)
TRIGL SERPL-MCNC: 142 MG/DL (ref 0–150)
VLDLC SERPL CALC-MCNC: 28 MG/DL

## 2021-10-15 PROCEDURE — 90471 IMMUNIZATION ADMIN: CPT | Performed by: NURSE PRACTITIONER

## 2021-10-15 PROCEDURE — 99396 PREV VISIT EST AGE 40-64: CPT | Performed by: NURSE PRACTITIONER

## 2021-10-15 PROCEDURE — 90688 IIV4 VACCINE SPLT 0.5 ML IM: CPT | Performed by: NURSE PRACTITIONER
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
## 2021-10-16 LAB
HIV AG/AB: NORMAL
HIV ANTIGEN: NORMAL
HIV-1 ANTIBODY: NORMAL
HIV-2 AB: NORMAL

## 2021-10-26 ENCOUNTER — OFFICE VISIT (OUTPATIENT)
Dept: GYNECOLOGY | Age: 51
End: 2021-10-26
Payer: COMMERCIAL

## 2021-10-26 VITALS
DIASTOLIC BLOOD PRESSURE: 87 MMHG | WEIGHT: 158.4 LBS | HEART RATE: 73 BPM | BODY MASS INDEX: 28.06 KG/M2 | SYSTOLIC BLOOD PRESSURE: 119 MMHG

## 2021-10-26 DIAGNOSIS — Z01.419 WELL WOMAN EXAM WITH ROUTINE GYNECOLOGICAL EXAM: Primary | ICD-10-CM

## 2021-10-26 PROCEDURE — 99386 PREV VISIT NEW AGE 40-64: CPT | Performed by: OBSTETRICS & GYNECOLOGY

## 2021-10-26 NOTE — PROGRESS NOTES
Subjective:      Patient ID: Glenis Wilder is a 45 y.o. female. HPI  pts here for annual gyn exam.  She denies complaints. She had a [place] hysterectomy but still has ovaries. She's been getting hormone pellets for the past 8 months. No bleeding. Up to date regarding mammogram and colonoscopy. Review of Systems Pertinent review of systems items discussed above. All others systems items not discussed above were negative. Objective:   Physical Exam  Constitutional:       Appearance: She is well-developed. HENT:      Head: Normocephalic and atraumatic. Neck:      Thyroid: No thyromegaly. Trachea: No tracheal deviation. Cardiovascular:      Rate and Rhythm: Normal rate and regular rhythm. Heart sounds: Normal heart sounds. No murmur heard. Pulmonary:      Effort: Pulmonary effort is normal. No respiratory distress. Breath sounds: Normal breath sounds. No wheezing or rales. Chest:      Breasts:         Right: No mass, nipple discharge or skin change. Left: No mass, nipple discharge or skin change. Abdominal:      General: There is no distension. Palpations: Abdomen is soft. There is no mass. Tenderness: There is no abdominal tenderness. There is no rebound. Genitourinary:     Labia:         Right: No lesion. Left: No lesion. Vagina: Normal. No foreign body. No vaginal discharge. Cervix: No cervical motion tenderness, discharge or friability. Uterus: Absent. Adnexa:         Right: No mass or tenderness. Left: No mass or tenderness. Rectum: Normal. Guaiac result negative. No mass or external hemorrhoid. Comments: Pap performed. Musculoskeletal:         General: Normal range of motion. Lymphadenopathy:      Cervical: No cervical adenopathy. Neurological:      Mental Status: She is alert and oriented to person, place, and time. Assessment:   Normal gyn exam     Plan:   Call with results.   F/u annual gyn exam.       Arelis Peter MD

## 2021-11-01 NOTE — PROGRESS NOTES
[address] [facility] time__0700__________        Surgery time_________11/5/21 _0800__    Take the following medications with a sip of water:    Do not eat or drink anything after 12:00 midnight prior to your surgery. This includes water chewing gum, mints and ice chips. You may brush your teeth and gargle the morning of your surgery, but do not swallow the water     Please see your family doctor/pediatrician for a history and physical and/or concerning medications. Bring any test results/reports from your physicians office. If you are under the care of a heart doctor or specialist doctor, please be aware that you may be asked to them for clearance    You may be asked to stop blood thinners such as Coumadin, Plavix, Fragmin, Lovenox, etc., or any anti-inflammatories such as:  Aspirin, Ibuprofen, Advil, Naproxen prior to your surgery. We also ask that you stop any OTC medications such as fish oil, vitamin E, glucosamine, garlic, Multivitamins, COQ 10, etc.    We ask that you do not smoke 24 hours prior to surgery  We ask that you do not  drink any alcoholic beverages 24 hours prior to surgery     You must make arrangements for a responsible adult to take you home after your surgery. For your safety you will not be allowed to leave alone or drive yourself home. Your surgery will be cancelled if you do not have a ride home. Also for your safety, it is strongly suggested that someone stay with you the first 24 hours after your surgery. A parent or legal guardian must accompany a child scheduled for surgery and plan to stay at the hospital until the child is discharged. Please do not bring other children with you. For your comfort, please wear simple loose fitting clothing to the hospital.  Please do not bring valuables.     Do not wear any make-up or nail polish on your fingers or toes      For your safety, please do not wear any jewelry or body piercing's on the day of surgery. All jewelry must be removed. If you have dentures, they will be removed before going to operating room. For your convenience, we will provide you with a container. If you wear contact lenses or glasses, they will be removed, please bring a case for them. If you have a living will and a durable power of  for healthcare, please bring in a copy. As part of our patient safety program to minimize surgical site infections, we ask you to do the following:    · Please notify your surgeon if you develop any illness between         now and the  day of your surgery. · This includes a cough, cold, fever, sore throat, nausea,         or vomiting, and diarrhea, etc.  ·  Please notify your surgeon if you experience dizziness, shortness         of breath or blurred vision between now and the time of your surgery. Do not shave your operative site 96 hours prior to surgery. For face and neck surgery, men may use an electric razor 48 hours   prior to surgery. You may shower the night before surgery or the morning of   your surgery with an antibacterial soap. You will need to bring a photo ID and insurance card    [facility] has an onsite pharmacy, would you like to utilize our pharmacy     If you will be staying overnight and use a C-pap machine, please bring   your C-pap to hospital     Our goal is to provide you with excellent care, therefore, visitors will be limited to two(2) in the room at a time so that we may focus on providing this care for you. Please contact pre-admission testing if you have any further questions. [facility] phone number:  [phone]  Please note these are generalized instructions for all surgical cases, you may be provided with more specific instructions according to your surgery.

## 2021-11-03 ENCOUNTER — ANESTHESIA EVENT (OUTPATIENT)
Dept: ENDOSCOPY | Age: 51
End: 2021-11-03
Payer: COMMERCIAL

## 2021-11-05 ENCOUNTER — ANESTHESIA (OUTPATIENT)
Dept: ENDOSCOPY | Age: 51
End: 2021-11-05
Payer: COMMERCIAL

## 2021-11-05 ENCOUNTER — HOSPITAL ENCOUNTER (OUTPATIENT)
Age: 51
Setting detail: OUTPATIENT SURGERY
Discharge: HOME | End: 2021-11-05
Attending: INTERNAL MEDICINE | Admitting: INTERNAL MEDICINE
Payer: COMMERCIAL

## 2021-11-05 VITALS
HEIGHT: 63 IN | SYSTOLIC BLOOD PRESSURE: 107 MMHG | OXYGEN SATURATION: 98 % | HEART RATE: 67 BPM | DIASTOLIC BLOOD PRESSURE: 92 MMHG | RESPIRATION RATE: 16 BRPM | WEIGHT: 155.2 LBS | TEMPERATURE: 96 F | BODY MASS INDEX: 27.5 KG/M2

## 2021-11-05 VITALS — SYSTOLIC BLOOD PRESSURE: 134 MMHG | OXYGEN SATURATION: 97 % | DIASTOLIC BLOOD PRESSURE: 106 MMHG

## 2021-11-05 DIAGNOSIS — Z12.11 SCREEN FOR COLON CANCER: ICD-10-CM

## 2021-11-05 LAB
GLUCOSE BLD-MCNC: 100 MG/DL (ref 70–99)
PERFORMED ON: ABNORMAL

## 2021-11-05 PROCEDURE — 3609010300 HC COLONOSCOPY W/BIOPSY SINGLE/MULTIPLE: Performed by: INTERNAL MEDICINE

## 2021-11-05 PROCEDURE — 2580000003 HC RX 258: Performed by: STUDENT IN AN ORGANIZED HEALTH CARE EDUCATION/TRAINING PROGRAM

## 2021-11-05 PROCEDURE — 7100000011 HC PHASE II RECOVERY - ADDTL 15 MIN: Performed by: INTERNAL MEDICINE

## 2021-11-05 PROCEDURE — 88305 TISSUE EXAM BY PATHOLOGIST: CPT

## 2021-11-05 PROCEDURE — 6360000002 HC RX W HCPCS: Performed by: NURSE ANESTHETIST, CERTIFIED REGISTERED

## 2021-11-05 PROCEDURE — 3700000000 HC ANESTHESIA ATTENDED CARE: Performed by: INTERNAL MEDICINE

## 2021-11-05 PROCEDURE — 2709999900 HC NON-CHARGEABLE SUPPLY: Performed by: INTERNAL MEDICINE

## 2021-11-05 PROCEDURE — 7100000010 HC PHASE II RECOVERY - FIRST 15 MIN: Performed by: INTERNAL MEDICINE

## 2021-11-05 PROCEDURE — 2500000003 HC RX 250 WO HCPCS: Performed by: NURSE ANESTHETIST, CERTIFIED REGISTERED

## 2021-11-05 PROCEDURE — 3700000001 HC ADD 15 MINUTES (ANESTHESIA): Performed by: INTERNAL MEDICINE

## 2021-11-05 RX ORDER — SODIUM CHLORIDE 9 MG/ML
INJECTION, SOLUTION INTRAVENOUS CONTINUOUS
Status: DISCONTINUED | OUTPATIENT
Start: 2021-11-05 | End: 2021-11-05 | Stop reason: HOSPADM

## 2021-11-05 RX ORDER — LABETALOL HYDROCHLORIDE 5 MG/ML
5 INJECTION, SOLUTION INTRAVENOUS EVERY 10 MIN PRN
Status: DISCONTINUED | OUTPATIENT
Start: 2021-11-05 | End: 2021-11-05 | Stop reason: HOSPADM

## 2021-11-05 RX ORDER — SODIUM CHLORIDE 0.9 % (FLUSH) 0.9 %
5-40 SYRINGE (ML) INJECTION PRN
Status: DISCONTINUED | OUTPATIENT
Start: 2021-11-05 | End: 2021-11-05 | Stop reason: HOSPADM

## 2021-11-05 RX ORDER — PROPOFOL 10 MG/ML
INJECTION, EMULSION INTRAVENOUS PRN
Status: DISCONTINUED | OUTPATIENT
Start: 2021-11-05 | End: 2021-11-05 | Stop reason: SDUPTHER

## 2021-11-05 RX ORDER — SODIUM CHLORIDE 9 MG/ML
25 INJECTION, SOLUTION INTRAVENOUS PRN
Status: DISCONTINUED | OUTPATIENT
Start: 2021-11-05 | End: 2021-11-05 | Stop reason: HOSPADM

## 2021-11-05 RX ORDER — ONDANSETRON 2 MG/ML
4 INJECTION INTRAMUSCULAR; INTRAVENOUS
Status: DISCONTINUED | OUTPATIENT
Start: 2021-11-05 | End: 2021-11-05 | Stop reason: HOSPADM

## 2021-11-05 RX ORDER — SODIUM CHLORIDE 0.9 % (FLUSH) 0.9 %
5-40 SYRINGE (ML) INJECTION EVERY 12 HOURS SCHEDULED
Status: DISCONTINUED | OUTPATIENT
Start: 2021-11-05 | End: 2021-11-05 | Stop reason: HOSPADM

## 2021-11-05 RX ORDER — PROPOFOL 10 MG/ML
INJECTION, EMULSION INTRAVENOUS CONTINUOUS PRN
Status: DISCONTINUED | OUTPATIENT
Start: 2021-11-05 | End: 2021-11-05 | Stop reason: SDUPTHER

## 2021-11-05 RX ORDER — PROMETHAZINE HYDROCHLORIDE 25 MG/ML
6.25 INJECTION, SOLUTION INTRAMUSCULAR; INTRAVENOUS
Status: DISCONTINUED | OUTPATIENT
Start: 2021-11-05 | End: 2021-11-05 | Stop reason: HOSPADM

## 2021-11-05 RX ORDER — LIDOCAINE HYDROCHLORIDE 20 MG/ML
INJECTION, SOLUTION EPIDURAL; INFILTRATION; INTRACAUDAL; PERINEURAL PRN
Status: DISCONTINUED | OUTPATIENT
Start: 2021-11-05 | End: 2021-11-05 | Stop reason: SDUPTHER

## 2021-11-05 ASSESSMENT — PAIN SCALES - GENERAL
PAINLEVEL_OUTOF10: 0

## 2021-11-05 ASSESSMENT — PAIN - FUNCTIONAL ASSESSMENT: PAIN_FUNCTIONAL_ASSESSMENT: 0-10

## 2021-11-05 NOTE — H&P
[facility]   Pre-operative History and Physical    Patient: Glenis Wilder  : 1970  Acct#: [de-identified]    History Obtained From: electronic medical record    HISTORY OF PRESENT ILLNESS  Procedure:Colonoscopy  Indications:screening  Past Medical History:        Diagnosis Date    Hypoglycemia     Reactive hypoglycemia 2018     Past Surgical History:        Procedure Laterality Date    BREAST REDUCTION SURGERY  10/2004     SECTION  1988, 1991    x 2    CHOLECYSTECTOMY  2015    HYSTERECTOMY      partial    ULNAR TUNNEL RELEASE       Medications prior to admission:   Prior to Admission medications    Medication Sig Start Date End Date Taking? Authorizing Provider   thyroid (ARMOUR) 30 MG tablet Take 30 mg by mouth daily   Yes Historical Provider, MD   Cholecalciferol (VITAMIN D3) 50 MCG (2000 UT) CAPS Take by mouth daily   Yes Historical Provider, MD   progesterone (PROMETRIUM) 200 MG CAPS capsule Take 200 mg by mouth daily    Yes Historical Provider, MD     Allergies:   Amoxicillin and Penicillins    Social History     Socioeconomic History    Marital status:      Spouse name: Not on file    Number of children: 2    Years of education: Not on file    Highest education level: Not on file   Occupational History    Not on file   Tobacco Use    Smoking status: Former Smoker     Packs/day: 0.25     Years: 5.00     Pack years: 1.25     Quit date: 2019     Years since quittin.8    Smokeless tobacco: Never Used   Vaping Use    Vaping Use: Never used   Substance and Sexual Activity    Alcohol use:  Yes     Alcohol/week: 7.0 standard drinks     Types: 7 Glasses of wine per week     Comment: 1 glass of wine per day    Drug use: No    Sexual activity: Yes     Partners: Male   Other Topics Concern    Not on file   Social History Narrative    Not on file     Social Determinants of Health     Financial Resource Strain:     Difficulty of Paying Living Expenses: Food Insecurity:     Worried About Running Out of Food in the Last Year:     [address] in the Last Year:    Transportation Needs:     Lack of Transportation (Medical):  Lack of Transportation (Non-Medical):    Physical Activity:     Days of Exercise per Week:     Minutes of Exercise per Session:    Stress:     Feeling of Stress :    Social Connections:     Frequency of Communication with Friends and Family:     Frequency of Social Gatherings with Friends and Family:     Attends [employer]ars[employer] Services:     Active Member of Clubs or Organizations:     Attends Club or Organization Meetings:     Marital Status:    Intimate Partner Violence:     Fear of Current or Ex-Partner:     Emotionally Abused:     Physically Abused:     Sexually Abused:      Family History   Problem Relation Age of Onset    Diabetes Mother     High Blood Pressure Mother     Heart Disease Father     High Blood Pressure Father     Heart Attack Father     Parkinsonism Father     Seizures Brother     Dementia Maternal Grandmother     Stroke Paternal Grandmother     Parkinsonism Paternal Grandmother     Lung Cancer Paternal Grandfather     Heart Disease Maternal Uncle          PHYSICAL EXAM:      /73   Pulse 69   Temp 96.7 °F (35.9 °C) (Temporal)   Resp 18   Ht 5' 3\" (1.6 m)   Wt 155 lb 3.2 oz (70.4 kg)   SpO2 97%   BMI 27.49 kg/m²  I        Heart:normal    Lungs: normal    Abdomen: normal      ASA Grade:  See anesthesia note      ASSESSMENT AND PLAN:    1. Procedure options, risks and benefits reviewed with patient and expresses understanding.

## 2021-11-05 NOTE — ANESTHESIA POSTPROCEDURE EVALUATION
[facility] Department of Anesthesiology  Post-Anesthesia Note       Name:  Autumn King                                  Age:  45 y.o. MRN:  [id number]     Last Vitals & Oxygen Saturation: /70   Pulse 73   Temp 96 °F (35.6 °C) (Temporal)   Resp 16   Ht 5' 3\" (1.6 m)   Wt 155 lb 3.2 oz (70.4 kg)   SpO2 97%   BMI 27.49 kg/m²   Patient Vitals for the past 4 hrs:   BP Temp Temp src Pulse Resp SpO2 Height Weight   11/05/21 0833 104/70   73 16 97 %     11/05/21 0824 97/70 96 °F (35.6 °C) Temporal 80 16 96 %     11/05/21 0722 122/73 96.7 °F (35.9 °C) Temporal 69 18 97 % 5' 3\" (1.6 m) 155 lb 3.2 oz (70.4 kg)       Level of consciousness:  Awake, alert    Respiratory: Respirations easy, no distress. Stable. Cardiovascular: Hemodynamically stable. Hydration: Adequate. PONV: Adequately managed. Post-op pain: Adequately controlled. Post-op assessment: Tolerated anesthetic well without complication. Complications:  None.     Jomar Duong MD  November 5, 2021   8:39 AM

## 2021-11-05 NOTE — PROCEDURES
[facility]  Endoscopy Note    Patient: Dayna Guy  : 1970  Acct#: [de-identified]    Procedure: Colonoscopy with biopsy    Date:  2021    Surgeon:  Hillary Rivas MD, MD    Referring Physician:  Stanton Fitzgerald    Previous Colonoscopy: No  Date: na  Greater than 3 years? na    Preoperative Diagnosis:  screening    Postoperative Diagnosis:  Colon polyp    Anesthesia:  See anesthesia note    Indications: This is a 45y.o. year old female who presents today with screening for colon cancer. Procedure: An informed consent was obtained from the patient after explanation of indications, benefits, possible risks and complications of the procedure. The patient was then taken to the endoscopy suite, placed in the left lateral decubitus position, and the above IV anesthesia was administered. A digital rectal examination was performed and revealed negative without mass, lesions or tenderness. The Olympus CFQ-180-AL video colonoscope was placed in the patient's rectum under digital direction and advanced to the cecum. The cecum was identified by characteristic anatomy and ballottment. The ileocecal valve was identified. The preparation was good. The scope was then withdrawn back through the cecum, ascending, transverse, descending and sigmoid colons. Carefull circumferential examination of the mucosa in these areas demonstrated a 3 mm polyp in the descending colon that was biopsied and removed. The scope was then withdrawn into the rectum and retroflexed. The retroflexed view of the anal verge and rectum demonstrates no abnormalities. The scope was straightened, the colon was decompressed and the scope was withdrawn from the patient. The patient tolerated the procedure well and was taken to the PACU in good condition. Estimated Blood Loss:  none    Impression:  Colon polyp    Recommendations:  Await pathology. Repeat colonoscopy in 5 years.     Hillary Rivas MD, MD [facility]  11/5/2021

## 2021-11-05 NOTE — ANESTHESIA PRE PROCEDURE
[facility] Department of Anesthesiology  Pre-Anesthesia Evaluation/Consultation       Name:  Autumn King  : 1970  Age:  45 y.o. MRN:  [id number]  Date: 2021           Surgeon: Surgeon(s):  Ramiro Burnham MD    Procedure: Procedure(s):  COLORECTAL CANCER SCREENING, NOT HIGH RISK     Allergies   Allergen Reactions    Amoxicillin Hives    Penicillins      Patient Active Problem List   Diagnosis    History of bilateral breast reduction surgery    Reactive hypoglycemia    Subclinical hypothyroidism    Stress incontinence     Past Medical History:   Diagnosis Date    Hypoglycemia     Reactive hypoglycemia 2018     Past Surgical History:   Procedure Laterality Date    BREAST REDUCTION SURGERY  10/2004     SECTION  , 1991    x 2    CHOLECYSTECTOMY  2015    HYSTERECTOMY      partial    ULNAR TUNNEL RELEASE       Social History     Tobacco Use    Smoking status: Former Smoker     Packs/day: 0.25     Years: 5.00     Pack years: 1.25     Quit date: 2019     Years since quittin.8    Smokeless tobacco: Never Used   Vaping Use    Vaping Use: Never used   Substance Use Topics    Alcohol use: Yes     Alcohol/week: 7.0 standard drinks     Types: 7 Glasses of wine per week     Comment: 1 glass of wine per day    Drug use: No     Medications  No current facility-administered medications on file prior to encounter.      Current Outpatient Medications on File Prior to Encounter   Medication Sig Dispense Refill    thyroid (ARMOUR) 30 MG tablet Take 30 mg by mouth daily      Cholecalciferol (VITAMIN D3) 50 MCG ( UT) CAPS Take by mouth daily      progesterone (PROMETRIUM) 200 MG CAPS capsule Take 200 mg by mouth daily        Current Facility-Administered Medications   Medication Dose Route Frequency Provider Last Rate Last Admin    0.9 % sodium chloride infusion   IntraVENous Continuous Hien Pritchett MD 75 mL/hr at 21 New Bag at 21    sodium chloride flush 0.9 % injection 5-40 mL  5-40 mL IntraVENous 2 times per day Chris Torres MD        sodium chloride flush 0.9 % injection 5-40 mL  5-40 mL IntraVENous PRN Chris Torres MD        0.9 % sodium chloride infusion  25 mL IntraVENous PRN Chris Torres MD         Vital Signs (Current)   Vitals:    21   BP: 122/73   Pulse: 69   Resp: 18   Temp: 96.7 °F (35.9 °C)   SpO2: 97%     Vital Signs Statistics (for past 48 hrs)     Temp  Av.7 °F (35.9 °C)  Min: 96.7 °F (35.9 °C)   Min taken time: 21  Max: 96.7 °F (35.9 °C)   Max taken time: 21  Pulse  Av  Min: 69   Min taken time: 21  Max: 71   Max taken time: 21  Resp  Av  Min: 18   Min taken time: 214  Max: 18   Max taken time: 21  BP  Min: 122/73   Min taken time: 21  Max: 122/73   Max taken time: 21  SpO2  Av %  Min: 97 %   Min taken time: 21  Max: 97 %   Max taken time: 21    BP Readings from Last 3 Encounters:   21 122/73   10/26/21 119/87   10/15/21 118/64     BMI  Body mass index is 27.49 kg/m². Estimated body mass index is 27.49 kg/m² as calculated from the following:    Height as of this encounter: 5' 3\" (1.6 m). Weight as of this encounter: 155 lb 3.2 oz (70.4 kg).     CBC No results found for: WBC, RBC, HGB, HCT, MCV, RDW, PLT  CMP    Lab Results   Component Value Date     10/15/2021    K 4.3 10/15/2021     10/15/2021    CO2 22 10/15/2021    BUN 12 10/15/2021    CREATININE 0.7 10/15/2021    GFRAA >60 10/15/2021    AGRATIO 2.0 10/15/2021    LABGLOM >60 10/15/2021    PROT 7.1 10/15/2021    CALCIUM 9.4 10/15/2021    BILITOT 0.5 10/15/2021    ALKPHOS 75 10/15/2021    AST 17 10/15/2021    ALT 18 10/15/2021     BMP    Lab Results   Component Value Date     10/15/2021    K 4.3 10/15/2021     10/15/2021    CO2 22 10/15/2021    BUN 12 10/15/2021    CREATININE 0.7 10/15/2021    CALCIUM 9.4 10/15/2021    GFRAA >60 10/15/2021    LABGLOM >60 10/15/2021     POCGlucose  No results for input(s): GLUCOSE in the last 72 hours. Coags  No results found for: PROTIME, INR, APTT  HCG (If Applicable) No results found for: PREGTESTUR, PREGSERUM, HCG, HCGQUANT   ABGs No results found for: PHART, PO2ART, TXF1XRL, AER9VXI, BEART, L4SVMEEO   Type & Screen (If Applicable)  No results found for: LABABO, LABRH                         BMI: Wt Readings from Last 3 Encounters:       NPO Status:   Date of last liquid consumption: 11/04/21   Time of last liquid consumption: 2330   Date of last solid food consumption: 11/03/21      Time of last solid consumption: 1900       Anesthesia Evaluation  Patient summary reviewed no history of anesthetic complications:   Airway: Mallampati: III  TM distance: >3 FB   Neck ROM: full   Dental: normal exam         Pulmonary:Negative Pulmonary ROS and normal exam                               Cardiovascular:Negative CV ROS  Exercise tolerance: good (>4 METS),           Rhythm: regular  Rate: normal           Beta Blocker:  Not on Beta Blocker         Neuro/Psych:   Negative Neuro/Psych ROS              GI/Hepatic/Renal: Neg GI/Hepatic/Renal ROS  (+) bowel prep,      (-) GERD       Endo/Other:    (+) hypothyroidism::., .                 Abdominal:             Vascular: negative vascular ROS. Other Findings:             Anesthesia Plan      MAC     ASA 2       Induction: intravenous. Anesthetic plan and risks discussed with patient. Plan discussed with CRNA. This pre-anesthesia assessment may be used as a history and physical.    DOS STAFF ADDENDUM:    Pt seen and examined, chart reviewed (including anesthesia, drug and allergy history). No interval changes to history and physical examination. Anesthetic plan, risks, benefits, alternatives, and personnel involved discussed with patient.  Questions and concerns addressed. Patient(family) verbalized an understanding and agrees to proceed.       Roxana Schaefer MD  November 5, 2021  7:30 AM

## 2022-01-01 NOTE — FLOWSHEET NOTE
[address] [facility] [address] Mahen[address]  Phone: [phone]   Fax:     [phone]    Physical Therapy Treatment Note/ Progress Report:     Date:  2021    Patient Name:  Janae Maurer    :  1970  MRN: [id number]    Pertinent Medical History: Additional Pertinent Hx: hypoglycemia    Medical/Treatment Diagnosis Information:  · Diagnosis: S16. 1XXA (ICD-10-CM) - Strain of neck muscle, initial [id number] (ICD-10-CM) - Thoracic myofascial strain, initial [id number] (ICD-10-CM) - Strain of right shoulder, initial encounter  · Treatment Diagnosis: decreased function    Insurance/Certification information:  PT Insurance Information: generic auto  Physician Information:  Referring Practitioner: Ryder Engle  Plan of care signed (Y/N): routed  Date of Patient follow up with Physician:      Progress Report: []  Yes  [x]  No     Date Range for reporting period:  Beginnin2021  Ending:     Progress report due (10 Rx/or 30 days whichever is less): 68/3/27     Recertification due (POC duration/ or 90 days whichever is less):      Visit # Insurance/POC Allowable Auth Needed   4 12 []Yes    []No     Functional Outcomes Measure:    Date Assessed: at eval  ndi- 18    Pain level:  3-5/10     SUBJECTIVE:  Pt states, \" I am still really sore with certain things \"    Pt states, \" Doing ok, still sore, not quite as bad \" Continues to pop a lot \"  21  Pt states, \" Needling seemed to help \"  21  Pt states, \" Improving, moving better, not popping as much \"  OBJECTIVE:   :   CERV ROM       Cervical Flexion 25 Pain with all motions on the right   Cervical Extension 10       Left Right   Cervical SB 10 10   Cervical rotation 30 35   Quadrant   +   Dorsal Glide        UE ROM Left Right   Shoulder Flex wfl 90 pain in thoracic, cerv, traps   Shoulder Abd   70   Shoulder ER   50 Shoulder IR   50   Elbow flex/ext   wfl   Wrist flex/ext/pro/sup       Finger flex/ext/opposition       Shoulder AROM WNL w OP       UE Strength  Left Right   Shoulder Flex   3+   Shoulder Scap   3+   Shoulder ABd (C5 Axillary)   3+   Shoulder ER    3+   Shoulder IR   3+           RESTRICTIONS/PRECAUTIONS:     Exercises/Interventions:   Therapeutic Ex (29774)  Min: Resistance/Repetitions Notes   cerv isos Rot x 20    Traps stretch 30 x 3    rotation     Add/ext     bilat ext Green x 30    Ir/er Green x 30    cerv prom All ranges         Manual Intervention (63976)  Min15 Mfr to bilat tram, traps, pa's to thoracic g r 4, mfr to right shoulder muscles, prom all directions, iastm to lumbar, thor, cerv tram, traps,  Medi cups to the same x 3 min, sl thoracic and lumbar rotation mob gr 4, right 1st rib mob, tp relwease to right scm    Cerv mobs/manip     Thoracic mobs/manip     CT manip     Rib mobilizations     STM          NMR re-education (51488)  Min:               Therapeutic Activity (73816)  Min:               Modalities  Min:                  Other Therapeutic Activities:  Pt was educated on PT POC, Diagnosis, Prognosis, pathomechanics as well as frequency and duration of scheduling future physical therapy appointments. Time was also taken on this day to answer all patient questions and participation in PT. Reviewed appointment policy in detail with patient and patient verbalized understanding. Home Exercise Program:Patient demonstrated proper technique, good tolerance,  and was given written instructions for the above exercises  Access Code: [email or web address]  URL: [email or web address]  Date: 09/09/2021  Prepared by: Kelley Butler    Exercises  Supine Cervical Rotation AROM on Pillow - 1 x daily - 7 x weekly - 3 sets - 10 reps  Hooklying Isometric Upper Neck Rotation - 1 x daily - 7 x weekly - 3 sets - 10 reps  Seated Shoulder Shrug Circles AROM Forward - 1 x daily - 7 x weekly - 3 sets - 10 reps  Modified Sidelying Thoracic Rotation - 1 x daily - 7 x weekly - 3 sets - 10 reps  Seated Scapular Retraction - 1 x daily - 7 x weekly - 3 sets - 10 reps  Access Code: [email or web address]  URL: [email or web address]  Date: 09/17/2021  Prepared by: Shasha Solis    Access Code: [email or web address]  URL: [email or web address]  Date: 09/17/2021  Prepared by: Shasha Solis    Exercises  Standing Shoulder Extension - 1 x daily - 7 x weekly - 3 sets - 10 reps  Standing Row with Resistance - 1 x daily - 7 x weekly - 3 sets - 10 reps  Plank with Thoracic Rotation on Counter - 1 x daily - 7 x weekly - 3 sets - 10 reps        Therapeutic Exercise and NMR EXR  [] (74198) Provided verbal/tactile cueing for activities related to strengthening, flexibility, endurance, ROM  for improvements in cervical, postural, scapular, scapulothoracic and UE control with self care, reaching, carrying, lifting, house/yardwork, driving/computer work.    [] (90171) Provided verbal/tactile cueing for activities related to improving balance, coordination, kinesthetic sense, posture, motor skill, proprioception  to assist with cervical, scapular, scapulothoracic and UE control with self care, reaching, carrying, lifting, house/yardwork, driving/computer work. Therapeutic Activities:    [] (82800 or 92678) Provided verbal/tactile cueing for activities related to improving balance, coordination, kinesthetic sense, posture, motor skill, proprioception and motor activation to allow for proper function of cervical, scapular, scapulothoracic and UE control with self care, carrying, lifting, driving/computer work.      Home Exercise Program:    [] (51164) Reviewed/Progressed HEP activities related to strengthening, flexibility, endurance, ROM of cervical, scapular, scapulothoracic and UE control with self care, reaching, carrying, lifting, house/yardwork, driving/computer work  [] (74302) Reviewed/Progressed HEP activities related to improving balance, coordination, kinesthetic sense, posture, motor skill, proprioception of cervical, scapular, scapulothoracic and UE control with self care, reaching, carrying, lifting, house/yardwork, driving/computer work      Manual Treatments:  PROM / STM / Oscillations-Mobs:  G-I, II, III, IV (PA's, Inf., Post.)  [] (02256) Provided manual therapy to mobilize soft tissue/joints of cervical/CT, scapular GHJ and UE for the purpose of decreasing headache, modulating pain, promoting relaxation,  increasing ROM, reducing/eliminating soft tissue swelling/inflammation/restriction, improving soft tissue extensibility and allowing for proper ROM for normal function with self care, reaching, carrying, lifting, house/yardwork, driving/computer work  Spoke with "[employer]  regarding the use of Dry Needling     Dry needling manual therapy: consisted on the placement of 15 needles in the following muscles:  right infraspiantus, teres minor, , right traps, bilateral sub occipitals, splenius captitis, splenius cervici, rectus capitismajor/minor, T1,2,3,4 multifidi, , .  A 60mm for all but 4 sub occipitals were 40 mm  mm needle was inserted, piston, rotated, and coned to produce intramuscular mobilization. These techniques were used to restore functional range of motion, reduce muscle spasm and induce healing in the corresponding musculature. (54858)  Clean Technique was utilized today while applying Dry needling treatment. The treatment sites where cleaned with 70% solution of  isopropyl alcohol . The PT washed their hands and utilized treatment gloves along with hand  prior to inserting the needles. All needles where removed and discarded in the appropriate sharps container. MD has given verbal and/or written approval for this treatment.        If BWC Please Indicate Time In/Out  CPT Code Time in Time out                                   Approval Dates:  CPT Code Units Approved Units Used  Date Updated:                     Charges:  Timed Code Treatment Minutes: 45   Total Treatment Minutes: 50     [] EVAL (LOW) 39998 (typically 20 minutes face-to-face)  [] EVAL (MOD) 41293 (typically 30 minutes face-to-face)  [] EVAL (HIGH) 79349 (typically 45 minutes face-to-face)  [] RE-EVAL     [x] XI(99120) x 1    [] Dry needle 1 or 2 Muscles (86474)  [] NMR (69529) x     [x] Dry needle 3+ Muscles (44641)  [x] Manual (34661) x1    [] Ultrasound (39020) x  [] TA (50152) x     [] Mech Traction (66137)  [] ES(attended) (62750)     [] ES (un) (45210):   [] Vasopump (76184) [] Ionto (76609)   [] Other:    Dorina Yostman:  Patient stated goal: \" I want to have less pain \"  [x]? Progressing: []? Met: []? Not Met: []? Adjusted     Therapist goals for Patient:   Short Term Goals: To be achieved in: 2 weeks  1. Independent in HEP and progression per patient tolerance, in order to prevent re-injury. [x]? Progressing: []? Met: []? Not Met: []? Adjusted  2. Patient will have a decrease in pain to facilitate improvement in movement, function, and ADLs as indicated by Functional Deficits. [x]? Progressing: []? Met: []? Not Met: []? Adjusted     Long Term Goals: To be achieved in: 8 weeks  1. Disability index score of 25%% or less for the NDI to assist with reaching prior level of function. [x]? Progressing: []? Met: []? Not Met: []? Adjusted  2. Patient will demonstrate increased AROM to [facility] of cervical/thoracic spine to allow for proper joint functioning as indicated by patients Functional Deficits. [x]? Progressing: []? Met: []? Not Met: []? Adjusted  3. Patient will demonstrate an increase in postural awareness and control and activation of  Deep cervical stabilizers to allow for proper functional mobility as indicated by patients Functional Deficits. [x]? Progressing: []? Met: []? Not Met: []? Adjusted  4. Patient will return to 75 functional activities without increased symptoms or restriction. [x]? Progressing: []? Met: []? Not Met: []?  Adjusted  5. (patient specific functional goal)    [x]? Progressing: []? Met: []? Not Met: []? Adjusted         ASSESSMENT:  See eval    Treatment/Activity Tolerance:  [x] Patient tolerated treatment well [] Patient limited by fatique  [] Patient limited by pain  [] Patient limited by other medical complications  [] Other:     Overall Progression Towards Functional goals/ Treatment Progress Update:  [] Patient is progressing as expected towards functional goals listed. [] Progression is slowed due to complexities/Impairments listed. [] Progression has been slowed due to co-morbidities. [x] Plan just implemented, too soon to assess goals progression <30days   [] Goals require adjustment due to lack of progress  [] Patient is not progressing as expected and requires additional follow up with physician  [] Other    Prognosis for POC: [x] Good [] Fair  [] Poor    Patient requires continued skilled intervention: [x] Yes  [] No        PLAN: Cervical, Thoracic, scapular, shoulder rom, strength, mfr, joint mobs, postural exercises,  stretches, modalities, patient education , home exercise plan, cervical traction, work, resting, and sleeping positions, may do dn      [] Continue per plan of care [] Alter current plan (see comments)  [x] Plan of care initiated [] Hold pending MD visit [] Discharge    Electronically signed by: Silke Frye PT    Note: If patient does not return for scheduled/recommended follow up visits, this note will serve as a discharge from care along with the most recent update on progress. Ken Prescott

## 2022-08-25 ENCOUNTER — TELEPHONE (OUTPATIENT)
Dept: FAMILY MEDICINE CLINIC | Age: 52
End: 2022-08-25

## 2022-08-25 NOTE — TELEPHONE ENCOUNTER
Please advise  no available appointments with any providers? Do we do vv or in office?   Pt wants a marko rene

## 2022-11-14 ENCOUNTER — PATIENT MESSAGE (OUTPATIENT)
Dept: GYNECOLOGY | Age: 52
End: 2022-11-14

## 2022-11-14 RX ORDER — FLUCONAZOLE 150 MG/1
150 TABLET ORAL ONCE
Qty: 1 TABLET | Refills: 0 | Status: SHIPPED | OUTPATIENT
Start: 2022-11-14 | End: 2022-11-14

## 2022-11-14 NOTE — TELEPHONE ENCOUNTER
From: Elia Park Cowden  To: Dr. Min Cease: 38/05/9010 8:12 AM EST  Subject: Yeast infection     Good morning   I have a yeast infection and wanted to know if you could send a script to [address] [place] [address] or do I need to come in . I also have a cold and didnt necessarily want to come to the office .  Thanks William Soria

## 2022-12-08 ENCOUNTER — PATIENT MESSAGE (OUTPATIENT)
Dept: FAMILY MEDICINE CLINIC | Age: 52
End: 2022-12-08

## 2022-12-08 NOTE — TELEPHONE ENCOUNTER
From: Arlis Bailer Cowden  To: Dr. Mona Stanton  Sent: 12/8/2022 4:27 AM EST  Subject: Positive COVID    I tested positive for COVID this morning. My  has it also and was able to get medication from his doc. This is my first day with mild symptoms. Please let me know if you can call something into [facility] .  Thanks [employer]

## 2023-02-22 ENCOUNTER — TELEMEDICINE (OUTPATIENT)
Dept: FAMILY MEDICINE CLINIC | Age: 53
End: 2023-02-22
Payer: COMMERCIAL

## 2023-02-22 DIAGNOSIS — J01.90 ACUTE SINUSITIS, RECURRENCE NOT SPECIFIED, UNSPECIFIED LOCATION: Primary | ICD-10-CM

## 2023-02-22 PROCEDURE — 99213 OFFICE O/P EST LOW 20 MIN: CPT | Performed by: INTERNAL MEDICINE

## 2023-02-22 RX ORDER — PREDNISONE 20 MG/1
TABLET ORAL
Qty: 7 TABLET | Refills: 0 | Status: SHIPPED | OUTPATIENT
Start: 2023-02-22

## 2023-02-22 RX ORDER — AZITHROMYCIN 250 MG/1
250 TABLET, FILM COATED ORAL SEE ADMIN INSTRUCTIONS
Qty: 6 TABLET | Refills: 0 | Status: SHIPPED | OUTPATIENT
Start: 2023-02-22 | End: 2023-02-27

## 2023-02-22 SDOH — ECONOMIC STABILITY: HOUSING INSECURITY
IN THE LAST 12 MONTHS, WAS THERE A TIME WHEN YOU DID NOT HAVE A STEADY PLACE TO SLEEP OR SLEPT IN A SHELTER (INCLUDING NOW)?: NO

## 2023-02-22 SDOH — ECONOMIC STABILITY: INCOME INSECURITY: HOW HARD IS IT FOR YOU TO PAY FOR THE VERY BASICS LIKE FOOD, HOUSING, MEDICAL CARE, AND HEATING?: NOT HARD AT ALL

## 2023-02-22 SDOH — ECONOMIC STABILITY: FOOD INSECURITY: WITHIN THE PAST 12 MONTHS, YOU WORRIED THAT YOUR FOOD WOULD RUN OUT BEFORE YOU GOT MONEY TO BUY MORE.: NEVER TRUE

## 2023-02-22 SDOH — ECONOMIC STABILITY: TRANSPORTATION INSECURITY
IN THE PAST 12 MONTHS, HAS LACK OF TRANSPORTATION KEPT YOU FROM MEETINGS, WORK, OR FROM GETTING THINGS NEEDED FOR DAILY LIVING?: NO

## 2023-02-22 SDOH — ECONOMIC STABILITY: FOOD INSECURITY: WITHIN THE PAST 12 MONTHS, THE FOOD YOU BOUGHT JUST DIDN'T LAST AND YOU DIDN'T HAVE MONEY TO GET MORE.: NEVER TRUE

## 2023-02-22 ASSESSMENT — ENCOUNTER SYMPTOMS
VOMITING: 0
COUGH: 1
NAUSEA: 0

## 2023-02-22 ASSESSMENT — PATIENT HEALTH QUESTIONNAIRE - PHQ9
SUM OF ALL RESPONSES TO PHQ QUESTIONS 1-9: 0
2. FEELING DOWN, DEPRESSED OR HOPELESS: 0
1. LITTLE INTEREST OR PLEASURE IN DOING THINGS: 0
SUM OF ALL RESPONSES TO PHQ9 QUESTIONS 1 & 2: 0
SUM OF ALL RESPONSES TO PHQ QUESTIONS 1-9: 0

## 2023-02-22 NOTE — PROGRESS NOTES
Amado Cuff Cowden (:  1970) is here for evaluation of the following:    Assessment/Plan  Loraine Matt was seen today for congestion. Diagnoses and all orders for this visit:    Acute sinusitis, recurrence not specified, unspecified location    Other orders  -     predniSONE (DELTASONE) 20 MG tablet; Take 2 tablets a day for  2 days then take  1 tablet a day for 2 days then 1/2 pill for 2 days  -     azithromycin (ZITHROMAX) 250 MG tablet; Take 1 tablet by mouth See Admin Instructions for 5 days 500mg on day 1 followed by 250mg on days 2 - 5   Try mucinex  Stop the dimetapp  Do another covid test day 3-4    Subjective   HPI  Sick 2 days  Pressure in frontal sinus & maxillary sinus  O2 sat normal  No fever   Covid neg twice   Drainage causing sore throat  Yellow sinus congestion and blood      Review of Systems   Constitutional:  Negative for fever. Respiratory:  Positive for cough (cough with drainage). Gastrointestinal:  Negative for nausea and vomiting. Objective   Patient-Reported Vitals  No data recorded     Physical Exam  Constitutional:       Appearance: Normal appearance. She is not ill-appearing. HENT:      Nose:      Comments: Sounds congested  Pulmonary:      Effort: Pulmonary effort is normal.   Neurological:      Mental Status: She is alert. Psychiatric:         Mood and Affect: Mood normal.         Behavior: Behavior normal.         Thought Content: Thought content normal.         Judgment: Judgment normal.                Brenda G Cowden, was evaluated through a synchronous (real-time) audio-video encounter. The patient (or guardian if applicable) is aware that this is a billable service, which includes applicable co-pays. This Virtual Visit was conducted with patient's (and/or legal guardian's) consent.  The visit was conducted pursuant to the emergency declaration under the [facility] 1135 waiver authority and the [facility] Response Supplemental Appropriations Act. Patient identification was verified, and a caregiver was present when appropriate.    The patient was located at Home: [address] [place] [address] [facility] [address]  [facility] Ezequiel Encarnacion MD

## 2023-03-09 ENCOUNTER — OFFICE VISIT (OUTPATIENT)
Dept: FAMILY MEDICINE CLINIC | Age: 53
End: 2023-03-09
Payer: COMMERCIAL

## 2023-03-09 VITALS
HEIGHT: 63 IN | TEMPERATURE: 97.6 F | WEIGHT: 158.4 LBS | DIASTOLIC BLOOD PRESSURE: 66 MMHG | BODY MASS INDEX: 28.07 KG/M2 | RESPIRATION RATE: 16 BRPM | HEART RATE: 87 BPM | OXYGEN SATURATION: 97 % | SYSTOLIC BLOOD PRESSURE: 114 MMHG

## 2023-03-09 DIAGNOSIS — M54.50 LOW BACK PAIN RADIATING TO RIGHT LEG: Primary | ICD-10-CM

## 2023-03-09 DIAGNOSIS — M79.604 LOW BACK PAIN RADIATING TO RIGHT LEG: Primary | ICD-10-CM

## 2023-03-09 PROCEDURE — 99213 OFFICE O/P EST LOW 20 MIN: CPT | Performed by: NURSE PRACTITIONER

## 2023-03-09 RX ORDER — PREDNISONE 10 MG/1
TABLET ORAL
Qty: 27 TABLET | Refills: 0 | Status: SHIPPED | OUTPATIENT
Start: 2023-03-09

## 2023-03-09 RX ORDER — METHOCARBAMOL 500 MG/1
500 TABLET, FILM COATED ORAL 3 TIMES DAILY
Qty: 30 TABLET | Refills: 0 | Status: SHIPPED | OUTPATIENT
Start: 2023-03-09 | End: 2023-03-19

## 2023-03-09 ASSESSMENT — ENCOUNTER SYMPTOMS
BACK PAIN: 1
NAUSEA: 0
CONSTIPATION: 0
ABDOMINAL PAIN: 0
DIARRHEA: 0
VOMITING: 0

## 2023-03-09 NOTE — PROGRESS NOTES
3/9/2023    This is a 47 y.o. female   Chief Complaint   Patient presents with    Back Pain     X2 weeks. Rt side lower back. Constant ache. Sharp at times. Radiates into rt leg. Burning in rt leg. Muscle spasms. Has hx of sciatica. Maria E SUÁREZ  Patient reports that 2 weeks started with right low back pain that will radiate into right leg. Has tried flexeril qhs, heating pad and ice PRN. Has been trying to stretch the area. Symptoms have not improved. Reports that pain is constant. Pain can increased to 8/10. Denies weakness in leg, but has slight burning in leg at times. Denies injury to area. She is not sure what she did that caused the current flare. Has had history of sciatic nerve issues, but can not remember when the last episode happened. Has taken robaxin in the past and felt that helped symptoms better than flexeril. Patient Active Problem List   Diagnosis    History of bilateral breast reduction surgery    Reactive hypoglycemia    Subclinical hypothyroidism    Stress incontinence       Current Outpatient Medications   Medication Sig Dispense Refill    thyroid (ARMOUR) 30 MG tablet Take 30 mg by mouth daily      Cholecalciferol (VITAMIN D3) 50 MCG (2000 UT) CAPS Take by mouth daily      progesterone (PROMETRIUM) 200 MG CAPS capsule Take 200 mg by mouth daily        No current facility-administered medications for this visit. Allergies   Allergen Reactions    Amoxicillin Hives    Penicillins        Review of Systems   Constitutional:  Positive for activity change. Negative for fever. Gastrointestinal:  Negative for abdominal pain, constipation, diarrhea, nausea and vomiting. Genitourinary:  Negative for difficulty urinating. Musculoskeletal:  Positive for arthralgias, back pain and myalgias. Neurological:  Negative for weakness.      Vitals:    03/09/23 1333   BP: 114/66   Site: Right Upper Arm   Position: Sitting   Cuff Size: Medium Adult   Pulse: 87   Resp: 16   Temp: 97.6 °F (36.4 °C)   TempSrc: Oral   SpO2: 97%   Weight: 158 lb 6.4 oz (71.8 kg)   Height: 5' 3\" (1.6 m)       Body mass index is 28.06 kg/m². Wt Readings from Last 3 Encounters:   03/09/23 158 lb 6.4 oz (71.8 kg)   11/05/21 155 lb 3.2 oz (70.4 kg)   10/26/21 158 lb 6.4 oz (71.8 kg)       BP Readings from Last 3 Encounters:   03/09/23 114/66   11/05/21 (!) 134/106   11/05/21 (!) 107/92       Physical Exam  Constitutional:       General: She is not in acute distress. Appearance: She is well-developed. HENT:      Head: Normocephalic and atraumatic. Pulmonary:      Effort: Pulmonary effort is normal.   Musculoskeletal:      Cervical back: Neck supple. Lumbar back: Tenderness present. No swelling or bony tenderness. Normal range of motion. Comments: Generalized left low back pain. No spinal tenderness. Does have subcutaneous cyst in left low back that is nontender to palpation. Nahid lower extremity strength is 5/5. Negative straight leg raise. Patient is able to heel and toe walk.   +muscle spasm with lumbar ROM. Skin:     General: Skin is warm and dry. Neurological:      Mental Status: She is alert and oriented to person, place, and time. Psychiatric:         Behavior: Behavior normal.         Thought Content: Thought content normal.         Judgment: Judgment normal.       Assessmentand Plan  Santiago Davis was seen today for back pain. Diagnoses and all orders for this visit:    Low back pain radiating to right leg  -     [facility] - Physical Therapy  -     predniSONE (DELTASONE) 10 MG tablet; Take 4 tablets by mouth daily x3 days, 3 tablets x3 days, 2 tablets x2 days, 1 tablets x2 days  -     methocarbamol (ROBAXIN) 500 MG tablet; Take 1 tablet by mouth 3 times daily for 10 days  Refer to PT  Complete prednisone taper. Once completed then can start aleve 2 tabs BID with food. Switch flexeril to methocarbamol TID PRN.    Advised to alternate between ice and heat PRN  If symptoms are not improving with PT, will need to obtain imaging   Patient is to call if symptoms worsen or fail to improve with the above therapies     Return in about 4 weeks (around 4/6/2023), or if symptoms worsen or fail to improve, for back pain , with Dr. Austin Wolf.

## 2023-05-20 ENCOUNTER — PATIENT MESSAGE (OUTPATIENT)
Dept: FAMILY MEDICINE CLINIC | Age: 53
End: 2023-05-20

## 2023-05-20 DIAGNOSIS — E03.8 SUBCLINICAL HYPOTHYROIDISM: Primary | ICD-10-CM

## 2023-05-20 DIAGNOSIS — E78.5 HYPERLIPIDEMIA LDL GOAL <130: ICD-10-CM

## 2023-05-20 DIAGNOSIS — Z82.49 FAMILY HISTORY OF CORONARY ARTERY DISEASE: ICD-10-CM

## 2023-05-22 PROBLEM — Z82.49 FAMILY HISTORY OF CORONARY ARTERY DISEASE: Status: ACTIVE | Noted: 2023-05-22

## 2023-05-22 PROBLEM — E78.5 HYPERLIPIDEMIA LDL GOAL <130: Status: ACTIVE | Noted: 2023-05-22

## 2023-12-05 DIAGNOSIS — Z01.89 ROUTINE LAB DRAW: ICD-10-CM

## 2023-12-05 DIAGNOSIS — E78.5 HYPERLIPIDEMIA LDL GOAL <130: Primary | ICD-10-CM

## 2023-12-07 DIAGNOSIS — E78.5 HYPERLIPIDEMIA LDL GOAL <130: ICD-10-CM

## 2023-12-07 DIAGNOSIS — Z01.89 ROUTINE LAB DRAW: ICD-10-CM

## 2023-12-07 LAB
ALBUMIN SERPL-MCNC: 4.4 G/DL (ref 3.4–5)
ALBUMIN/GLOB SERPL: 2.2 {RATIO} (ref 1.1–2.2)
ALP SERPL-CCNC: 61 U/L (ref 40–129)
ALT SERPL-CCNC: 29 U/L (ref 10–40)
ANION GAP SERPL CALCULATED.3IONS-SCNC: 8 MMOL/L (ref 3–16)
AST SERPL-CCNC: 21 U/L (ref 15–37)
BILIRUB SERPL-MCNC: 0.3 MG/DL (ref 0–1)
BUN SERPL-MCNC: 12 MG/DL (ref 7–20)
CALCIUM SERPL-MCNC: 9.4 MG/DL (ref 8.3–10.6)
CHLORIDE SERPL-SCNC: 105 MMOL/L (ref 99–110)
CHOLEST SERPL-MCNC: 194 MG/DL (ref 0–199)
CO2 SERPL-SCNC: 26 MMOL/L (ref 21–32)
CREAT SERPL-MCNC: 0.8 MG/DL (ref 0.6–1.1)
GFR SERPLBLD CREATININE-BSD FMLA CKD-EPI: >60 ML/MIN/{1.73_M2}
GLUCOSE SERPL-MCNC: 95 MG/DL (ref 70–99)
HDLC SERPL-MCNC: 49 MG/DL (ref 40–60)
LDLC SERPL CALC-MCNC: 124 MG/DL
POTASSIUM SERPL-SCNC: 4.4 MMOL/L (ref 3.5–5.1)
PROT SERPL-MCNC: 6.4 G/DL (ref 6.4–8.2)
SODIUM SERPL-SCNC: 139 MMOL/L (ref 136–145)
TRIGL SERPL-MCNC: 106 MG/DL (ref 0–150)
VLDLC SERPL CALC-MCNC: 21 MG/DL

## 2023-12-08 LAB
EST. AVERAGE GLUCOSE BLD GHB EST-MCNC: 93.9 MG/DL
HBA1C MFR BLD: 4.9 %

## 2023-12-14 ENCOUNTER — OFFICE VISIT (OUTPATIENT)
Dept: FAMILY MEDICINE CLINIC | Age: 53
End: 2023-12-14
Payer: COMMERCIAL

## 2023-12-14 VITALS
HEART RATE: 87 BPM | BODY MASS INDEX: 24.8 KG/M2 | WEIGHT: 140 LBS | DIASTOLIC BLOOD PRESSURE: 74 MMHG | OXYGEN SATURATION: 97 % | SYSTOLIC BLOOD PRESSURE: 110 MMHG | HEIGHT: 63 IN

## 2023-12-14 DIAGNOSIS — Z23 NEED FOR SHINGLES VACCINE: ICD-10-CM

## 2023-12-14 DIAGNOSIS — Z23 NEED FOR INFLUENZA VACCINATION: ICD-10-CM

## 2023-12-14 DIAGNOSIS — Z12.31 OTHER SCREENING MAMMOGRAM: ICD-10-CM

## 2023-12-14 DIAGNOSIS — E03.8 SUBCLINICAL HYPOTHYROIDISM: ICD-10-CM

## 2023-12-14 DIAGNOSIS — Z00.00 ENCOUNTER FOR WELL ADULT EXAM WITHOUT ABNORMAL FINDINGS: Primary | ICD-10-CM

## 2023-12-14 DIAGNOSIS — E16.1 REACTIVE HYPOGLYCEMIA: ICD-10-CM

## 2023-12-14 DIAGNOSIS — Z23 NEED FOR TETANUS BOOSTER: ICD-10-CM

## 2023-12-14 DIAGNOSIS — E78.5 HYPERLIPIDEMIA LDL GOAL <130: ICD-10-CM

## 2023-12-14 PROCEDURE — 90674 CCIIV4 VAC NO PRSV 0.5 ML IM: CPT | Performed by: FAMILY MEDICINE

## 2023-12-14 PROCEDURE — G8482 FLU IMMUNIZE ORDER/ADMIN: HCPCS | Performed by: FAMILY MEDICINE

## 2023-12-14 PROCEDURE — 90471 IMMUNIZATION ADMIN: CPT | Performed by: FAMILY MEDICINE

## 2023-12-14 PROCEDURE — 99396 PREV VISIT EST AGE 40-64: CPT | Performed by: FAMILY MEDICINE

## 2023-12-14 PROCEDURE — 90472 IMMUNIZATION ADMIN EACH ADD: CPT | Performed by: FAMILY MEDICINE

## 2023-12-14 PROCEDURE — 90715 TDAP VACCINE 7 YRS/> IM: CPT | Performed by: FAMILY MEDICINE

## 2023-12-14 RX ORDER — BUSPIRONE HYDROCHLORIDE 5 MG/1
5 TABLET ORAL 2 TIMES DAILY
COMMUNITY
Start: 2023-11-17

## 2023-12-14 RX ORDER — TRAZODONE HYDROCHLORIDE 50 MG/1
TABLET ORAL
COMMUNITY
Start: 2023-11-17
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
## 2023-12-14 NOTE — PATIENT INSTRUCTIONS
INSTRUCTIONS  NEXT APPOINTMENT: Please schedule fasting annual physical (30 minutes) in one year. OK to have water, black coffee and medications (except for diabetes medicines) with Dr. Shelley Womack or her NP, Lopez Amanda. PLEASE TAKE THIS FORM TO CHECK-OUT WINDOW TO SCHEDULE NEXT VISIT. Please get annual flu and covid vaccines when available in fall. Can get at stores such as [employer] and [employer]. Please get mammogram soon to screen for breast cancer. To schedule at a [facility], please call [phone]. Get annual COVID vaccine once a year. Can get at the same time as flu shot OR separate from other vaccines by at least 2 weeks. Get Shingrix #1 in 1-3 months and #2 2-6 months after that.

## 2024-02-15 ENCOUNTER — HOSPITAL ENCOUNTER (OUTPATIENT)
Dept: GENERAL RADIOLOGY | Age: 54
Discharge: HOME | End: 2024-02-15
Payer: COMMERCIAL

## 2024-02-15 ENCOUNTER — HOSPITAL ENCOUNTER (OUTPATIENT)
Age: 54
Discharge: HOME | End: 2024-02-15
Payer: COMMERCIAL

## 2024-02-15 ENCOUNTER — OFFICE VISIT (OUTPATIENT)
Dept: FAMILY MEDICINE CLINIC | Age: 54
End: 2024-02-15
Payer: COMMERCIAL

## 2024-02-15 VITALS
RESPIRATION RATE: 18 BRPM | TEMPERATURE: 97.7 F | BODY MASS INDEX: 24.45 KG/M2 | WEIGHT: 138 LBS | HEIGHT: 63 IN | DIASTOLIC BLOOD PRESSURE: 72 MMHG | HEART RATE: 88 BPM | SYSTOLIC BLOOD PRESSURE: 116 MMHG | OXYGEN SATURATION: 98 %

## 2024-02-15 DIAGNOSIS — M79.641 RIGHT HAND PAIN: ICD-10-CM

## 2024-02-15 DIAGNOSIS — R20.0 NUMBNESS AND TINGLING OF RIGHT HAND: Primary | ICD-10-CM

## 2024-02-15 DIAGNOSIS — R20.2 NUMBNESS AND TINGLING OF RIGHT HAND: Primary | ICD-10-CM

## 2024-02-15 LAB
ALBUMIN SERPL-MCNC: 4.7 G/DL (ref 3.4–5)
ALBUMIN/GLOB SERPL: 2.5 {RATIO} (ref 1.1–2.2)
ALP SERPL-CCNC: 60 U/L (ref 40–129)
ALT SERPL-CCNC: 21 U/L (ref 10–40)
ANION GAP SERPL CALCULATED.3IONS-SCNC: 13 MMOL/L (ref 3–16)
AST SERPL-CCNC: 18 U/L (ref 15–37)
BASOPHILS # BLD: 0.1 K/UL (ref 0–0.2)
BASOPHILS NFR BLD: 0.9 %
BILIRUB SERPL-MCNC: 0.5 MG/DL (ref 0–1)
BUN SERPL-MCNC: 12 MG/DL (ref 7–20)
CALCIUM SERPL-MCNC: 9.4 MG/DL (ref 8.3–10.6)
CHLORIDE SERPL-SCNC: 102 MMOL/L (ref 99–110)
CO2 SERPL-SCNC: 25 MMOL/L (ref 21–32)
CREAT SERPL-MCNC: 0.7 MG/DL (ref 0.6–1.1)
CRP SERPL-MCNC: <3 MG/L (ref 0–5.1)
DEPRECATED RDW RBC AUTO: 13.6 % (ref 12.4–15.4)
EOSINOPHIL # BLD: 0.1 K/UL (ref 0–0.6)
EOSINOPHIL NFR BLD: 0.7 %
ERYTHROCYTE [SEDIMENTATION RATE] IN BLOOD BY WESTERGREN METHOD: 3 MM/HR (ref 0–30)
GFR SERPLBLD CREATININE-BSD FMLA CKD-EPI: >60 ML/MIN/{1.73_M2}
GLUCOSE SERPL-MCNC: 83 MG/DL (ref 70–99)
HCT VFR BLD AUTO: 45.2 % (ref 36–48)
HGB BLD-MCNC: 15.5 G/DL (ref 12–16)
LYMPHOCYTES # BLD: 1.7 K/UL (ref 1–5.1)
LYMPHOCYTES NFR BLD: 19.8 %
MCH RBC QN AUTO: 31.5 PG (ref 26–34)
MCHC RBC AUTO-ENTMCNC: 34.2 G/DL (ref 31–36)
MCV RBC AUTO: 92 FL (ref 80–100)
MONOCYTES # BLD: 0.5 K/UL (ref 0–1.3)
MONOCYTES NFR BLD: 5.8 %
NEUTROPHILS # BLD: 6.3 K/UL (ref 1.7–7.7)
NEUTROPHILS NFR BLD: 72.8 %
PLATELET # BLD AUTO: 301 K/UL (ref 135–450)
PMV BLD AUTO: 8 FL (ref 5–10.5)
POTASSIUM SERPL-SCNC: 4.4 MMOL/L (ref 3.5–5.1)
PROT SERPL-MCNC: 6.6 G/DL (ref 6.4–8.2)
RBC # BLD AUTO: 4.92 M/UL (ref 4–5.2)
RHEUMATOID FACT SER IA-ACNC: <10 IU/ML
SODIUM SERPL-SCNC: 140 MMOL/L (ref 136–145)
WBC # BLD AUTO: 8.7 K/UL (ref 4–11)

## 2024-02-15 PROCEDURE — 1036F TOBACCO NON-USER: CPT | Performed by: NURSE PRACTITIONER

## 2024-02-15 PROCEDURE — G8427 DOCREV CUR MEDS BY ELIG CLIN: HCPCS | Performed by: NURSE PRACTITIONER

## 2024-02-15 PROCEDURE — G8420 CALC BMI NORM PARAMETERS: HCPCS | Performed by: NURSE PRACTITIONER

## 2024-02-15 PROCEDURE — 99214 OFFICE O/P EST MOD 30 MIN: CPT | Performed by: NURSE PRACTITIONER

## 2024-02-15 PROCEDURE — 3017F COLORECTAL CA SCREEN DOC REV: CPT | Performed by: NURSE PRACTITIONER

## 2024-02-15 PROCEDURE — 73130 X-RAY EXAM OF HAND: CPT

## 2024-02-15 PROCEDURE — G8482 FLU IMMUNIZE ORDER/ADMIN: HCPCS | Performed by: NURSE PRACTITIONER

## 2024-02-15 RX ORDER — TESTOSTERONE 100 MG
PELLET (EA) IMPLANTATION
COMMUNITY

## 2024-02-15 RX ORDER — ESTRADIOL 0.1 MG/G
2 CREAM VAGINAL
COMMUNITY

## 2024-02-15 NOTE — PROGRESS NOTES
2/15/2024    This is a 53 y.o. female   Chief Complaint   Patient presents with    Hand Pain     Started about year ago but has gotten worse.  When she bumps her fingers and they swell. Has knot on rt hand and rt wrist.  Pain in hands and wrists.  Numbness in hands and into wrists.    .    HPI  Patient reports that for the past year has had pain in right hand and wrist. When sleeping at night will have numbness in fingers.   Does not take anything for pain. Has used voltaren gel once with some improvement in wrist pain.   She will have swelling in her finger joints at times.   Has a cyst on right wrist that will be more noticeable at times. Area will be tender to palpation at times.   She reports that she has family history of RA in her mother.      Patient Active Problem List   Diagnosis    History of bilateral breast reduction surgery    Reactive hypoglycemia    Subclinical hypothyroidism    Stress incontinence    Hyperlipidemia LDL goal <130    Family history of coronary artery disease       Current Outpatient Medications   Medication Sig Dispense Refill    estradiol (ESTRACE) 0.1 MG/GM vaginal cream Place 2 g vaginally three times a week      Testosterone 100 MG PLLT by Implant route.      busPIRone (BUSPAR) 5 MG tablet Take 1 tablet by mouth 2 times daily      Semaglutide-Weight Management (WEGOVY) 1.7 MG/0.75ML SOAJ SC injection       traZODone (DESYREL) 50 MG tablet TAKE 1 TABLET BY MOUTH AT NIGHT FOR SLEEP/DEPRESSION      thyroid (ARMOUR) 30 MG tablet Take 1 tablet by mouth daily      Cholecalciferol (VITAMIN D3) 50 MCG (2000 UT) CAPS Take by mouth daily      progesterone (PROMETRIUM) 200 MG CAPS capsule Take 1 capsule by mouth daily       No current facility-administered medications for this visit.       Allergies   Allergen Reactions    Amoxicillin Hives    Penicillins        Review of Systems   Constitutional:  Negative for activity change and fever.   Musculoskeletal:  Positive for arthralgias, joint

## 2024-08-15 ENCOUNTER — OFFICE VISIT (OUTPATIENT)
Dept: FAMILY MEDICINE CLINIC | Age: 54
End: 2024-08-15
Payer: COMMERCIAL

## 2024-08-15 VITALS
SYSTOLIC BLOOD PRESSURE: 118 MMHG | TEMPERATURE: 97.7 F | HEIGHT: 63 IN | DIASTOLIC BLOOD PRESSURE: 70 MMHG | BODY MASS INDEX: 23.78 KG/M2 | OXYGEN SATURATION: 97 % | WEIGHT: 134.2 LBS | RESPIRATION RATE: 18 BRPM | HEART RATE: 78 BPM

## 2024-08-15 DIAGNOSIS — B96.89 ACUTE BACTERIAL SINUSITIS: Primary | ICD-10-CM

## 2024-08-15 DIAGNOSIS — J01.90 ACUTE BACTERIAL SINUSITIS: Primary | ICD-10-CM

## 2024-08-15 PROCEDURE — 3017F COLORECTAL CA SCREEN DOC REV: CPT | Performed by: NURSE PRACTITIONER

## 2024-08-15 PROCEDURE — G8427 DOCREV CUR MEDS BY ELIG CLIN: HCPCS | Performed by: NURSE PRACTITIONER

## 2024-08-15 PROCEDURE — 1036F TOBACCO NON-USER: CPT | Performed by: NURSE PRACTITIONER

## 2024-08-15 PROCEDURE — 99213 OFFICE O/P EST LOW 20 MIN: CPT | Performed by: NURSE PRACTITIONER

## 2024-08-15 PROCEDURE — G8420 CALC BMI NORM PARAMETERS: HCPCS | Performed by: NURSE PRACTITIONER

## 2024-08-15 RX ORDER — SERTRALINE HYDROCHLORIDE 25 MG/1
25 TABLET, FILM COATED ORAL DAILY
COMMUNITY

## 2024-08-15 RX ORDER — AZITHROMYCIN 250 MG/1
TABLET, FILM COATED ORAL
Qty: 1 PACKET | Refills: 0 | Status: SHIPPED | OUTPATIENT
Start: 2024-08-15 | End: 2024-08-25

## 2024-08-15 SDOH — ECONOMIC STABILITY: FOOD INSECURITY: WITHIN THE PAST 12 MONTHS, THE FOOD YOU BOUGHT JUST DIDN'T LAST AND YOU DIDN'T HAVE MONEY TO GET MORE.: NEVER TRUE

## 2024-08-15 SDOH — ECONOMIC STABILITY: FOOD INSECURITY: WITHIN THE PAST 12 MONTHS, YOU WORRIED THAT YOUR FOOD WOULD RUN OUT BEFORE YOU GOT MONEY TO BUY MORE.: NEVER TRUE

## 2024-08-15 ASSESSMENT — ENCOUNTER SYMPTOMS
ABDOMINAL PAIN: 0
SORE THROAT: 1
SHORTNESS OF BREATH: 0
COUGH: 1
RHINORRHEA: 1

## 2024-08-15 NOTE — PROGRESS NOTES
8/15/2024    This is a 54 y.o. female   Chief Complaint   Patient presents with    Pharyngitis     Started about a week ago. Started as cough and congestion.  Sore throat started this am.  Body aches.  Wheezing. Headache. Home covid test Saturday was negative.   Sinus pressure.    .    HPI  Patient reports that symptoms started a week ago. +congestion, PND, cough, sinus drainage is green.  Denies fever, shortness of breath.   Started to take mucinex BID to help with cough.      Patient Active Problem List   Diagnosis    History of bilateral breast reduction surgery    Reactive hypoglycemia    Subclinical hypothyroidism    Stress incontinence    Hyperlipidemia LDL goal <130    Family history of coronary artery disease       Current Outpatient Medications   Medication Sig Dispense Refill    sertraline (ZOLOFT) 25 MG tablet Take 1 tablet by mouth daily      estradiol (ESTRACE) 0.1 MG/GM vaginal cream Place 2 g vaginally three times a week      Testosterone 100 MG PLLT by Implant route.      Semaglutide-Weight Management (WEGOVY) 1.7 MG/0.75ML SOAJ SC injection       traZODone (DESYREL) 50 MG tablet TAKE 1 TABLET BY MOUTH AT NIGHT FOR SLEEP/DEPRESSION      thyroid (ARMOUR) 30 MG tablet Take 1 tablet by mouth daily      Cholecalciferol (VITAMIN D3) 50 MCG (2000 UT) CAPS Take by mouth daily      progesterone (PROMETRIUM) 200 MG CAPS capsule Take 1 capsule by mouth daily      busPIRone (BUSPAR) 5 MG tablet Take 1 tablet by mouth 2 times daily (Patient not taking: Reported on 8/15/2024)       No current facility-administered medications for this visit.       Allergies   Allergen Reactions    Amoxicillin Hives    Penicillins        Review of Systems   Constitutional:  Positive for activity change and fatigue. Negative for fever.   HENT:  Positive for congestion, postnasal drip, rhinorrhea and sore throat.    Respiratory:  Positive for cough. Negative for shortness of breath.    Cardiovascular:  Negative for chest pain.

## 2025-01-03 ENCOUNTER — TELEPHONE (OUTPATIENT)
Dept: FAMILY MEDICINE CLINIC | Age: 55
End: 2025-01-03

## 2025-01-03 ENCOUNTER — OFFICE VISIT (OUTPATIENT)
Dept: FAMILY MEDICINE CLINIC | Age: 55
End: 2025-01-03

## 2025-01-03 VITALS
HEIGHT: 63 IN | DIASTOLIC BLOOD PRESSURE: 78 MMHG | BODY MASS INDEX: 25.83 KG/M2 | HEART RATE: 91 BPM | OXYGEN SATURATION: 98 % | TEMPERATURE: 98 F | SYSTOLIC BLOOD PRESSURE: 118 MMHG | RESPIRATION RATE: 20 BRPM | WEIGHT: 145.8 LBS

## 2025-01-03 DIAGNOSIS — U07.1 COVID-19 VIRUS INFECTION: ICD-10-CM

## 2025-01-03 DIAGNOSIS — J06.9 URI WITH COUGH AND CONGESTION: Primary | ICD-10-CM

## 2025-01-03 LAB
INFLUENZA A ANTIBODY: NORMAL
INFLUENZA B ANTIBODY: NORMAL
Lab: ABNORMAL
PERFORMING INSTRUMENT: ABNORMAL
QC PASS/FAIL: ABNORMAL
SARS-COV-2, POC: DETECTED

## 2025-01-03 RX ORDER — DEXTROMETHORPHAN HYDROBROMIDE AND PROMETHAZINE HYDROCHLORIDE 15; 6.25 MG/5ML; MG/5ML
5 SYRUP ORAL 4 TIMES DAILY PRN
Qty: 140 ML | Refills: 0 | Status: SHIPPED | OUTPATIENT
Start: 2025-01-03 | End: 2025-01-10

## 2025-01-03 ASSESSMENT — ENCOUNTER SYMPTOMS
SHORTNESS OF BREATH: 0
ABDOMINAL PAIN: 0
SORE THROAT: 1
COUGH: 1
RHINORRHEA: 1

## 2025-01-03 NOTE — TELEPHONE ENCOUNTER
Pt has some upper respiratory congestion , slight cough , drainage in back of her throat she said her spouse was seen for same symptoms     No appointments open    Please advise

## 2025-01-03 NOTE — PROGRESS NOTES
General: Skin is warm and dry.   Neurological:      Mental Status: She is alert and oriented to person, place, and time.   Psychiatric:         Behavior: Behavior normal.         Assessmentand Plan  Daisy \"Tamika\" was seen today for sinus problem.    Diagnoses and all orders for this visit:    URI with cough and congestion  -     POCT Influenza A/B- negative   -     POCT COVID-19, Antigen- positive     COVID-19 virus infection  -     promethazine-dextromethorphan (PROMETHAZINE-DM) 6.25-15 MG/5ML syrup; Take 5 mLs by mouth 4 times daily as needed for Cough  Advised to rest and hydrate with water.   She does not have a chronic health condition that would qualifier her for paxlovid therapy  Advised symptomatic management   Normal saline spray PRN  Mucinex BID PRN  Patient is to call if symptoms worsen or fail to improve within the week.       Return if symptoms worsen or fail to improve.

## (undated) DEVICE — FORCEPS BX 240CM 2.4MM L NDL RAD JAW 4 M00513334